# Patient Record
Sex: FEMALE | Race: WHITE | NOT HISPANIC OR LATINO | URBAN - METROPOLITAN AREA
[De-identification: names, ages, dates, MRNs, and addresses within clinical notes are randomized per-mention and may not be internally consistent; named-entity substitution may affect disease eponyms.]

---

## 2017-02-20 ENCOUNTER — OUTPATIENT (OUTPATIENT)
Dept: OUTPATIENT SERVICES | Facility: HOSPITAL | Age: 72
LOS: 1 days | End: 2017-02-20

## 2017-07-20 ENCOUNTER — OUTPATIENT (OUTPATIENT)
Dept: OUTPATIENT SERVICES | Facility: HOSPITAL | Age: 72
LOS: 1 days | End: 2017-07-20

## 2017-09-14 ENCOUNTER — OUTPATIENT (OUTPATIENT)
Dept: OUTPATIENT SERVICES | Facility: HOSPITAL | Age: 72
LOS: 1 days | End: 2017-09-14

## 2017-11-16 ENCOUNTER — OUTPATIENT (OUTPATIENT)
Dept: OUTPATIENT SERVICES | Facility: HOSPITAL | Age: 72
LOS: 1 days | End: 2017-11-16

## 2018-02-26 ENCOUNTER — OUTPATIENT (OUTPATIENT)
Dept: OUTPATIENT SERVICES | Facility: HOSPITAL | Age: 73
LOS: 1 days | End: 2018-02-26

## 2018-04-06 ENCOUNTER — OUTPATIENT (OUTPATIENT)
Dept: OUTPATIENT SERVICES | Facility: HOSPITAL | Age: 73
LOS: 1 days | End: 2018-04-06

## 2018-04-10 ENCOUNTER — OUTPATIENT (OUTPATIENT)
Dept: OUTPATIENT SERVICES | Facility: HOSPITAL | Age: 73
LOS: 1 days | End: 2018-04-10

## 2019-02-28 ENCOUNTER — OUTPATIENT (OUTPATIENT)
Dept: OUTPATIENT SERVICES | Facility: HOSPITAL | Age: 74
LOS: 1 days | End: 2019-02-28

## 2019-04-19 PROBLEM — Z00.00 ENCOUNTER FOR PREVENTIVE HEALTH EXAMINATION: Status: ACTIVE | Noted: 2019-04-19

## 2019-04-25 ENCOUNTER — APPOINTMENT (OUTPATIENT)
Dept: MAMMOGRAPHY | Facility: CLINIC | Age: 74
End: 2019-04-25
Payer: MEDICARE

## 2019-04-25 PROCEDURE — 77063 BREAST TOMOSYNTHESIS BI: CPT

## 2019-04-25 PROCEDURE — 77067 SCR MAMMO BI INCL CAD: CPT

## 2019-06-18 ENCOUNTER — OUTPATIENT (OUTPATIENT)
Dept: OUTPATIENT SERVICES | Facility: HOSPITAL | Age: 74
LOS: 1 days | End: 2019-06-18

## 2019-08-09 ENCOUNTER — OUTPATIENT (OUTPATIENT)
Dept: OUTPATIENT SERVICES | Facility: HOSPITAL | Age: 74
LOS: 1 days | End: 2019-08-09
Payer: MEDICARE

## 2019-08-09 PROCEDURE — 93010 ELECTROCARDIOGRAM REPORT: CPT

## 2019-10-17 ENCOUNTER — OUTPATIENT (OUTPATIENT)
Dept: OUTPATIENT SERVICES | Facility: HOSPITAL | Age: 74
LOS: 1 days | End: 2019-10-17

## 2019-11-19 ENCOUNTER — APPOINTMENT (OUTPATIENT)
Dept: RADIOLOGY | Facility: CLINIC | Age: 74
End: 2019-11-19
Payer: MEDICARE

## 2019-11-19 ENCOUNTER — OUTPATIENT (OUTPATIENT)
Dept: OUTPATIENT SERVICES | Facility: HOSPITAL | Age: 74
LOS: 1 days | End: 2019-11-19

## 2019-11-19 ENCOUNTER — APPOINTMENT (OUTPATIENT)
Dept: ULTRASOUND IMAGING | Facility: CLINIC | Age: 74
End: 2019-11-19
Payer: MEDICARE

## 2019-11-19 PROCEDURE — 76700 US EXAM ABDOM COMPLETE: CPT

## 2019-11-19 PROCEDURE — 71046 X-RAY EXAM CHEST 2 VIEWS: CPT

## 2019-12-22 ENCOUNTER — EMERGENCY (EMERGENCY)
Facility: HOSPITAL | Age: 74
LOS: 1 days | End: 2019-12-22
Admitting: EMERGENCY MEDICINE
Payer: MEDICARE

## 2019-12-22 PROCEDURE — 99282 EMERGENCY DEPT VISIT SF MDM: CPT

## 2019-12-29 ENCOUNTER — EMERGENCY (EMERGENCY)
Facility: HOSPITAL | Age: 74
LOS: 1 days | End: 2019-12-29
Admitting: EMERGENCY MEDICINE
Payer: MEDICARE

## 2019-12-29 PROCEDURE — 99284 EMERGENCY DEPT VISIT MOD MDM: CPT

## 2019-12-29 PROCEDURE — 74176 CT ABD & PELVIS W/O CONTRAST: CPT | Mod: 26

## 2019-12-30 ENCOUNTER — EMERGENCY (EMERGENCY)
Facility: HOSPITAL | Age: 74
LOS: 1 days | End: 2019-12-30
Admitting: EMERGENCY MEDICINE
Payer: MEDICARE

## 2019-12-30 PROCEDURE — 99284 EMERGENCY DEPT VISIT MOD MDM: CPT

## 2020-01-01 PROCEDURE — 71045 X-RAY EXAM CHEST 1 VIEW: CPT | Mod: 26

## 2020-01-01 PROCEDURE — 99285 EMERGENCY DEPT VISIT HI MDM: CPT

## 2020-01-02 ENCOUNTER — OUTPATIENT (OUTPATIENT)
Dept: OUTPATIENT SERVICES | Facility: HOSPITAL | Age: 75
LOS: 1 days | End: 2020-01-02

## 2020-01-02 ENCOUNTER — INPATIENT (INPATIENT)
Facility: HOSPITAL | Age: 75
LOS: 0 days | Discharge: HOME HEALTH | End: 2020-01-03
Admitting: STUDENT IN AN ORGANIZED HEALTH CARE EDUCATION/TRAINING PROGRAM
Payer: MEDICARE

## 2020-01-02 PROCEDURE — 72148 MRI LUMBAR SPINE W/O DYE: CPT | Mod: 26

## 2020-01-02 PROCEDURE — 99221 1ST HOSP IP/OBS SF/LOW 40: CPT

## 2020-01-03 ENCOUNTER — OUTPATIENT (OUTPATIENT)
Dept: OUTPATIENT SERVICES | Facility: HOSPITAL | Age: 75
LOS: 1 days | End: 2020-01-03

## 2020-01-08 ENCOUNTER — OUTPATIENT (OUTPATIENT)
Dept: OUTPATIENT SERVICES | Facility: HOSPITAL | Age: 75
LOS: 1 days | End: 2020-01-08

## 2020-01-13 ENCOUNTER — TRANSCRIPTION ENCOUNTER (OUTPATIENT)
Age: 75
End: 2020-01-13

## 2020-01-15 ENCOUNTER — INPATIENT (INPATIENT)
Facility: HOSPITAL | Age: 75
LOS: 0 days | Discharge: HOME | DRG: 871 | End: 2020-01-16
Attending: HOSPITALIST | Admitting: HOSPITALIST
Payer: COMMERCIAL

## 2020-01-15 ENCOUNTER — TRANSCRIPTION ENCOUNTER (OUTPATIENT)
Age: 75
End: 2020-01-15

## 2020-01-15 VITALS
RESPIRATION RATE: 20 BRPM | DIASTOLIC BLOOD PRESSURE: 94 MMHG | HEART RATE: 88 BPM | OXYGEN SATURATION: 94 % | WEIGHT: 169.98 LBS | SYSTOLIC BLOOD PRESSURE: 149 MMHG | TEMPERATURE: 98 F | HEIGHT: 60 IN

## 2020-01-15 DIAGNOSIS — I10 ESSENTIAL (PRIMARY) HYPERTENSION: ICD-10-CM

## 2020-01-15 DIAGNOSIS — A41.9 SEPSIS, UNSPECIFIED ORGANISM: ICD-10-CM

## 2020-01-15 DIAGNOSIS — Z90.49 ACQUIRED ABSENCE OF OTHER SPECIFIED PARTS OF DIGESTIVE TRACT: Chronic | ICD-10-CM

## 2020-01-15 DIAGNOSIS — M54.9 DORSALGIA, UNSPECIFIED: ICD-10-CM

## 2020-01-15 DIAGNOSIS — I48.91 UNSPECIFIED ATRIAL FIBRILLATION: ICD-10-CM

## 2020-01-15 DIAGNOSIS — J44.1 CHRONIC OBSTRUCTIVE PULMONARY DISEASE WITH (ACUTE) EXACERBATION: ICD-10-CM

## 2020-01-15 LAB
ALBUMIN SERPL ELPH-MCNC: 4 G/DL — SIGNIFICANT CHANGE UP (ref 3.3–5.2)
ALP SERPL-CCNC: 95 U/L — SIGNIFICANT CHANGE UP (ref 40–120)
ALT FLD-CCNC: 12 U/L — SIGNIFICANT CHANGE UP
ANION GAP SERPL CALC-SCNC: 16 MMOL/L — SIGNIFICANT CHANGE UP (ref 5–17)
APPEARANCE UR: CLEAR — SIGNIFICANT CHANGE UP
APTT BLD: 39.2 SEC — HIGH (ref 27.5–36.3)
AST SERPL-CCNC: 26 U/L — SIGNIFICANT CHANGE UP
BASOPHILS # BLD AUTO: 0.06 K/UL — SIGNIFICANT CHANGE UP (ref 0–0.2)
BASOPHILS NFR BLD AUTO: 0.5 % — SIGNIFICANT CHANGE UP (ref 0–2)
BILIRUB SERPL-MCNC: 0.8 MG/DL — SIGNIFICANT CHANGE UP (ref 0.4–2)
BILIRUB UR-MCNC: NEGATIVE — SIGNIFICANT CHANGE UP
BUN SERPL-MCNC: 16 MG/DL — SIGNIFICANT CHANGE UP (ref 8–20)
CALCIUM SERPL-MCNC: 9.7 MG/DL — SIGNIFICANT CHANGE UP (ref 8.6–10.2)
CHLORIDE SERPL-SCNC: 97 MMOL/L — LOW (ref 98–107)
CO2 SERPL-SCNC: 23 MMOL/L — SIGNIFICANT CHANGE UP (ref 22–29)
COLOR SPEC: YELLOW — SIGNIFICANT CHANGE UP
CREAT SERPL-MCNC: 0.86 MG/DL — SIGNIFICANT CHANGE UP (ref 0.5–1.3)
DIFF PNL FLD: ABNORMAL
EOSINOPHIL # BLD AUTO: 0.41 K/UL — SIGNIFICANT CHANGE UP (ref 0–0.5)
EOSINOPHIL NFR BLD AUTO: 3.1 % — SIGNIFICANT CHANGE UP (ref 0–6)
EPI CELLS # UR: SIGNIFICANT CHANGE UP
FLU A RESULT: SIGNIFICANT CHANGE UP
FLU A RESULT: SIGNIFICANT CHANGE UP
FLUAV AG NPH QL: SIGNIFICANT CHANGE UP
FLUBV AG NPH QL: SIGNIFICANT CHANGE UP
GLUCOSE SERPL-MCNC: 130 MG/DL — HIGH (ref 70–115)
GLUCOSE UR QL: NEGATIVE MG/DL — SIGNIFICANT CHANGE UP
HCT VFR BLD CALC: 48.2 % — HIGH (ref 34.5–45)
HGB BLD-MCNC: 15.7 G/DL — HIGH (ref 11.5–15.5)
IMM GRANULOCYTES NFR BLD AUTO: 0.8 % — SIGNIFICANT CHANGE UP (ref 0–1.5)
INR BLD: 1.82 RATIO — HIGH (ref 0.88–1.16)
KETONES UR-MCNC: NEGATIVE — SIGNIFICANT CHANGE UP
LACTATE BLDV-MCNC: 1.2 MMOL/L — SIGNIFICANT CHANGE UP (ref 0.5–2)
LEUKOCYTE ESTERASE UR-ACNC: NEGATIVE — SIGNIFICANT CHANGE UP
LYMPHOCYTES # BLD AUTO: 1.01 K/UL — SIGNIFICANT CHANGE UP (ref 1–3.3)
LYMPHOCYTES # BLD AUTO: 7.7 % — LOW (ref 13–44)
MCHC RBC-ENTMCNC: 29.7 PG — SIGNIFICANT CHANGE UP (ref 27–34)
MCHC RBC-ENTMCNC: 32.6 GM/DL — SIGNIFICANT CHANGE UP (ref 32–36)
MCV RBC AUTO: 91.3 FL — SIGNIFICANT CHANGE UP (ref 80–100)
MONOCYTES # BLD AUTO: 0.84 K/UL — SIGNIFICANT CHANGE UP (ref 0–0.9)
MONOCYTES NFR BLD AUTO: 6.4 % — SIGNIFICANT CHANGE UP (ref 2–14)
NEUTROPHILS # BLD AUTO: 10.63 K/UL — HIGH (ref 1.8–7.4)
NEUTROPHILS NFR BLD AUTO: 81.5 % — HIGH (ref 43–77)
NITRITE UR-MCNC: NEGATIVE — SIGNIFICANT CHANGE UP
PH UR: 6 — SIGNIFICANT CHANGE UP (ref 5–8)
PLATELET # BLD AUTO: 258 K/UL — SIGNIFICANT CHANGE UP (ref 150–400)
POTASSIUM SERPL-MCNC: 3.9 MMOL/L — SIGNIFICANT CHANGE UP (ref 3.5–5.3)
POTASSIUM SERPL-SCNC: 3.9 MMOL/L — SIGNIFICANT CHANGE UP (ref 3.5–5.3)
PROCALCITONIN SERPL-MCNC: 0.11 NG/ML — HIGH (ref 0.02–0.1)
PROT SERPL-MCNC: 8.4 G/DL — SIGNIFICANT CHANGE UP (ref 6.6–8.7)
PROT UR-MCNC: 15 MG/DL
PROTHROM AB SERPL-ACNC: 21.3 SEC — HIGH (ref 10–12.9)
RBC # BLD: 5.28 M/UL — HIGH (ref 3.8–5.2)
RBC # FLD: 13 % — SIGNIFICANT CHANGE UP (ref 10.3–14.5)
RBC CASTS # UR COMP ASSIST: SIGNIFICANT CHANGE UP /HPF (ref 0–4)
RSV RESULT: SIGNIFICANT CHANGE UP
RSV RNA RESP QL NAA+PROBE: SIGNIFICANT CHANGE UP
SODIUM SERPL-SCNC: 136 MMOL/L — SIGNIFICANT CHANGE UP (ref 135–145)
SP GR SPEC: 1.01 — SIGNIFICANT CHANGE UP (ref 1.01–1.02)
UROBILINOGEN FLD QL: NEGATIVE MG/DL — SIGNIFICANT CHANGE UP
WBC # BLD: 13.05 K/UL — HIGH (ref 3.8–10.5)
WBC # FLD AUTO: 13.05 K/UL — HIGH (ref 3.8–10.5)
WBC UR QL: SIGNIFICANT CHANGE UP

## 2020-01-15 PROCEDURE — 99223 1ST HOSP IP/OBS HIGH 75: CPT

## 2020-01-15 PROCEDURE — 12345: CPT | Mod: NC

## 2020-01-15 PROCEDURE — 93010 ELECTROCARDIOGRAM REPORT: CPT

## 2020-01-15 PROCEDURE — 71045 X-RAY EXAM CHEST 1 VIEW: CPT | Mod: 26

## 2020-01-15 PROCEDURE — 99285 EMERGENCY DEPT VISIT HI MDM: CPT

## 2020-01-15 RX ORDER — OXYCODONE HYDROCHLORIDE 5 MG/1
5 TABLET ORAL EVERY 6 HOURS
Refills: 0 | Status: DISCONTINUED | OUTPATIENT
Start: 2020-01-15 | End: 2020-01-16

## 2020-01-15 RX ORDER — ROSUVASTATIN CALCIUM 5 MG/1
1 TABLET ORAL
Qty: 0 | Refills: 0 | DISCHARGE

## 2020-01-15 RX ORDER — MAGNESIUM SULFATE 500 MG/ML
2 VIAL (ML) INJECTION ONCE
Refills: 0 | Status: COMPLETED | OUTPATIENT
Start: 2020-01-15 | End: 2020-01-15

## 2020-01-15 RX ORDER — AZITHROMYCIN 500 MG/1
500 TABLET, FILM COATED ORAL ONCE
Refills: 0 | Status: COMPLETED | OUTPATIENT
Start: 2020-01-15 | End: 2020-01-15

## 2020-01-15 RX ORDER — OXYCODONE AND ACETAMINOPHEN 5; 325 MG/1; MG/1
1 TABLET ORAL ONCE
Refills: 0 | Status: DISCONTINUED | OUTPATIENT
Start: 2020-01-15 | End: 2020-01-15

## 2020-01-15 RX ORDER — SERTRALINE 25 MG/1
100 TABLET, FILM COATED ORAL DAILY
Refills: 0 | Status: DISCONTINUED | OUTPATIENT
Start: 2020-01-15 | End: 2020-01-16

## 2020-01-15 RX ORDER — PANTOPRAZOLE SODIUM 20 MG/1
1 TABLET, DELAYED RELEASE ORAL
Qty: 0 | Refills: 0 | DISCHARGE

## 2020-01-15 RX ORDER — TIOTROPIUM BROMIDE 18 UG/1
1 CAPSULE ORAL; RESPIRATORY (INHALATION) DAILY
Refills: 0 | Status: DISCONTINUED | OUTPATIENT
Start: 2020-01-15 | End: 2020-01-16

## 2020-01-15 RX ORDER — ATORVASTATIN CALCIUM 80 MG/1
40 TABLET, FILM COATED ORAL AT BEDTIME
Refills: 0 | Status: DISCONTINUED | OUTPATIENT
Start: 2020-01-15 | End: 2020-01-16

## 2020-01-15 RX ORDER — ACETAMINOPHEN 500 MG
975 TABLET ORAL ONCE
Refills: 0 | Status: COMPLETED | OUTPATIENT
Start: 2020-01-15 | End: 2020-01-15

## 2020-01-15 RX ORDER — IPRATROPIUM/ALBUTEROL SULFATE 18-103MCG
3 AEROSOL WITH ADAPTER (GRAM) INHALATION
Qty: 10 | Refills: 0
Start: 2020-01-15 | End: 2020-02-13

## 2020-01-15 RX ORDER — AZITHROMYCIN 500 MG/1
500 TABLET, FILM COATED ORAL EVERY 24 HOURS
Refills: 0 | Status: DISCONTINUED | OUTPATIENT
Start: 2020-01-16 | End: 2020-01-16

## 2020-01-15 RX ORDER — AZITHROMYCIN 500 MG/1
TABLET, FILM COATED ORAL
Refills: 0 | Status: DISCONTINUED | OUTPATIENT
Start: 2020-01-15 | End: 2020-01-16

## 2020-01-15 RX ORDER — ALBUTEROL 90 UG/1
2.5 AEROSOL, METERED ORAL ONCE
Refills: 0 | Status: COMPLETED | OUTPATIENT
Start: 2020-01-15 | End: 2020-01-15

## 2020-01-15 RX ORDER — IPRATROPIUM/ALBUTEROL SULFATE 18-103MCG
3 AEROSOL WITH ADAPTER (GRAM) INHALATION EVERY 6 HOURS
Refills: 0 | Status: DISCONTINUED | OUTPATIENT
Start: 2020-01-15 | End: 2020-01-16

## 2020-01-15 RX ORDER — AZITHROMYCIN 500 MG/1
1 TABLET, FILM COATED ORAL
Qty: 3 | Refills: 0
Start: 2020-01-15 | End: 2020-01-17

## 2020-01-15 RX ORDER — CEFEPIME 1 G/1
2000 INJECTION, POWDER, FOR SOLUTION INTRAMUSCULAR; INTRAVENOUS ONCE
Refills: 0 | Status: COMPLETED | OUTPATIENT
Start: 2020-01-15 | End: 2020-01-15

## 2020-01-15 RX ORDER — APIXABAN 2.5 MG/1
5 TABLET, FILM COATED ORAL EVERY 12 HOURS
Refills: 0 | Status: DISCONTINUED | OUTPATIENT
Start: 2020-01-15 | End: 2020-01-16

## 2020-01-15 RX ORDER — ACETAMINOPHEN 500 MG
650 TABLET ORAL EVERY 6 HOURS
Refills: 0 | Status: DISCONTINUED | OUTPATIENT
Start: 2020-01-15 | End: 2020-01-16

## 2020-01-15 RX ORDER — AZITHROMYCIN 500 MG/1
1 TABLET, FILM COATED ORAL
Qty: 4 | Refills: 0
Start: 2020-01-15 | End: 2020-01-18

## 2020-01-15 RX ORDER — OLMESARTAN MEDOXOMIL 5 MG/1
1 TABLET, FILM COATED ORAL
Qty: 0 | Refills: 0 | DISCHARGE

## 2020-01-15 RX ORDER — METOPROLOL TARTRATE 50 MG
25 TABLET ORAL DAILY
Refills: 0 | Status: DISCONTINUED | OUTPATIENT
Start: 2020-01-15 | End: 2020-01-16

## 2020-01-15 RX ORDER — ALPRAZOLAM 0.25 MG
0.25 TABLET ORAL ONCE
Refills: 0 | Status: DISCONTINUED | OUTPATIENT
Start: 2020-01-15 | End: 2020-01-15

## 2020-01-15 RX ORDER — LOSARTAN POTASSIUM 100 MG/1
100 TABLET, FILM COATED ORAL DAILY
Refills: 0 | Status: DISCONTINUED | OUTPATIENT
Start: 2020-01-15 | End: 2020-01-16

## 2020-01-15 RX ORDER — IPRATROPIUM/ALBUTEROL SULFATE 18-103MCG
3 AEROSOL WITH ADAPTER (GRAM) INHALATION
Refills: 0 | Status: COMPLETED | OUTPATIENT
Start: 2020-01-15 | End: 2020-01-15

## 2020-01-15 RX ORDER — PANTOPRAZOLE SODIUM 20 MG/1
40 TABLET, DELAYED RELEASE ORAL
Refills: 0 | Status: DISCONTINUED | OUTPATIENT
Start: 2020-01-15 | End: 2020-01-16

## 2020-01-15 RX ORDER — BUDESONIDE AND FORMOTEROL FUMARATE DIHYDRATE 160; 4.5 UG/1; UG/1
2 AEROSOL RESPIRATORY (INHALATION)
Refills: 0 | Status: DISCONTINUED | OUTPATIENT
Start: 2020-01-15 | End: 2020-01-16

## 2020-01-15 RX ORDER — SODIUM CHLORIDE 9 MG/ML
1400 INJECTION, SOLUTION INTRAVENOUS ONCE
Refills: 0 | Status: COMPLETED | OUTPATIENT
Start: 2020-01-15 | End: 2020-01-15

## 2020-01-15 RX ADMIN — CEFEPIME 100 MILLIGRAM(S): 1 INJECTION, POWDER, FOR SOLUTION INTRAMUSCULAR; INTRAVENOUS at 01:21

## 2020-01-15 RX ADMIN — ATORVASTATIN CALCIUM 40 MILLIGRAM(S): 80 TABLET, FILM COATED ORAL at 21:13

## 2020-01-15 RX ADMIN — SERTRALINE 100 MILLIGRAM(S): 25 TABLET, FILM COATED ORAL at 11:12

## 2020-01-15 RX ADMIN — APIXABAN 5 MILLIGRAM(S): 2.5 TABLET, FILM COATED ORAL at 06:47

## 2020-01-15 RX ADMIN — Medication 3 MILLILITER(S): at 01:21

## 2020-01-15 RX ADMIN — Medication 3 MILLILITER(S): at 15:17

## 2020-01-15 RX ADMIN — APIXABAN 5 MILLIGRAM(S): 2.5 TABLET, FILM COATED ORAL at 16:52

## 2020-01-15 RX ADMIN — Medication 650 MILLIGRAM(S): at 20:37

## 2020-01-15 RX ADMIN — LOSARTAN POTASSIUM 100 MILLIGRAM(S): 100 TABLET, FILM COATED ORAL at 06:48

## 2020-01-15 RX ADMIN — AZITHROMYCIN 255 MILLIGRAM(S): 500 TABLET, FILM COATED ORAL at 06:46

## 2020-01-15 RX ADMIN — Medication 50 GRAM(S): at 02:05

## 2020-01-15 RX ADMIN — ALBUTEROL 2.5 MILLIGRAM(S): 90 AEROSOL, METERED ORAL at 03:37

## 2020-01-15 RX ADMIN — Medication 3 MILLILITER(S): at 19:32

## 2020-01-15 RX ADMIN — Medication 650 MILLIGRAM(S): at 11:24

## 2020-01-15 RX ADMIN — Medication 3 MILLILITER(S): at 02:00

## 2020-01-15 RX ADMIN — Medication 40 MILLIGRAM(S): at 06:47

## 2020-01-15 RX ADMIN — Medication 975 MILLIGRAM(S): at 01:20

## 2020-01-15 RX ADMIN — Medication 0.25 MILLIGRAM(S): at 21:13

## 2020-01-15 RX ADMIN — Medication 975 MILLIGRAM(S): at 20:37

## 2020-01-15 RX ADMIN — OXYCODONE HYDROCHLORIDE 5 MILLIGRAM(S): 5 TABLET ORAL at 21:50

## 2020-01-15 RX ADMIN — Medication 3 MILLILITER(S): at 01:14

## 2020-01-15 RX ADMIN — Medication 125 MILLIGRAM(S): at 01:14

## 2020-01-15 RX ADMIN — PANTOPRAZOLE SODIUM 40 MILLIGRAM(S): 20 TABLET, DELAYED RELEASE ORAL at 09:34

## 2020-01-15 RX ADMIN — OXYCODONE AND ACETAMINOPHEN 1 TABLET(S): 5; 325 TABLET ORAL at 20:37

## 2020-01-15 RX ADMIN — Medication 25 MILLIGRAM(S): at 06:57

## 2020-01-15 RX ADMIN — OXYCODONE AND ACETAMINOPHEN 1 TABLET(S): 5; 325 TABLET ORAL at 03:37

## 2020-01-15 RX ADMIN — SODIUM CHLORIDE 1400 MILLILITER(S): 9 INJECTION, SOLUTION INTRAVENOUS at 01:14

## 2020-01-15 RX ADMIN — OXYCODONE HYDROCHLORIDE 5 MILLIGRAM(S): 5 TABLET ORAL at 21:13

## 2020-01-15 RX ADMIN — Medication 3 MILLILITER(S): at 09:29

## 2020-01-15 RX ADMIN — Medication 1 TABLET(S): at 11:12

## 2020-01-15 RX ADMIN — Medication 40 MILLIGRAM(S): at 16:52

## 2020-01-15 NOTE — ED PROVIDER NOTE - OBJECTIVE STATEMENT
73 y/o F pt with significant PMHx of A-fib and COPD presents to the ED c/o SOB that started today with diarrhea, as well as, a cough that started yesterday. Pt states she is visiting her daughter due to back problems. A week ago she was in the hospital for an MRI of her lumbar spine and was discharged on steroids and muscle relaxers, but her PMD took her off the steroids. Has never been admitted for her COPD. No further complaints at this time.

## 2020-01-15 NOTE — DISCHARGE NOTE PROVIDER - NSDCMRMEDTOKEN_GEN_ALL_CORE_FT
azithromycin 250 mg oral tablet: 1 tab(s) orally once a day   biotin 1000 mcg oral tablet: 1 tab(s) orally once a day  Calcium 600+D oral tablet: 1 tab(s) orally once a day  Eliquis 5 mg oral tablet: 1 tab(s) orally 2 times a day  ipratropium-albuterol 0.5 mg-2.5 mg/3 mLinhalation solution: 3 milliliter(s) inhaled every 6 hours, As Needed   Metoprolol Succinate ER 25 mg oral tablet, extended release: 1 tab(s) orally once a day  nebulizer machine: 1   olmesartan 40 mg oral tablet: 1 tab(s) orally once a day  oxyCODONE 5 mg oral capsule: 1 cap(s) orally every 6 hours, As Needed  pantoprazole 40 mg oral delayed release tablet: 1 tab(s) orally once a day  predniSONE 10 mg oral tablet: 4 tab po daily x 2 days then taper by 10mg every 2 days then stop   rosuvastatin 10 mg oral tablet: 1 tab(s) orally once a day  sertraline 100 mg oral tablet: 1 tab(s) orally once a day azithromycin 250 mg oral tablet: 1 tab(s) orally once a day   PLEASE DISREGARD other script thank yin   biotin 1000 mcg oral tablet: 1 tab(s) orally once a day  Calcium 600+D oral tablet: 1 tab(s) orally once a day  Eliquis 5 mg oral tablet: 1 tab(s) orally 2 times a day  ipratropium-albuterol 0.5 mg-2.5 mg/3 mLinhalation solution: 3 milliliter(s) inhaled every 6 hours, As Needed   Metoprolol Succinate ER 25 mg oral tablet, extended release: 1 tab(s) orally once a day  nebulizer machine: 1   olmesartan 40 mg oral tablet: 1 tab(s) orally once a day  oxyCODONE 5 mg oral capsule: 1 cap(s) orally every 6 hours, As Needed  pantoprazole 40 mg oral delayed release tablet: 1 tab(s) orally once a day  predniSONE 10 mg oral tablet: 4 tab po daily x 2 days then taper by 10mg every 2 days then stop   rosuvastatin 10 mg oral tablet: 1 tab(s) orally once a day  sertraline 100 mg oral tablet: 1 tab(s) orally once a day azithromycin 250 mg oral tablet: 1 tab(s) orally once a day   PLEASE DISREGARD other script thank yin   biotin 1000 mcg oral tablet: 1 tab(s) orally once a day  Calcium 600+D oral tablet: 1 tab(s) orally once a day  Eliquis 5 mg oral tablet: 1 tab(s) orally 2 times a day  home o2: 1   ipratropium-albuterol 0.5 mg-2.5 mg/3 mLinhalation solution: 3 milliliter(s) inhaled every 6 hours, As Needed   Metoprolol Succinate ER 25 mg oral tablet, extended release: 1 tab(s) orally once a day  nebulizer machine: 1   olmesartan 40 mg oral tablet: 1 tab(s) orally once a day  oxyCODONE 5 mg oral capsule: 1 cap(s) orally every 6 hours, As Needed  pantoprazole 40 mg oral delayed release tablet: 1 tab(s) orally once a day  predniSONE 10 mg oral tablet: 4 tab po daily x 2 days then taper by 10mg every 2 days then stop   rosuvastatin 10 mg oral tablet: 1 tab(s) orally once a day  sertraline 100 mg oral tablet: 1 tab(s) orally once a day

## 2020-01-15 NOTE — ED ADULT NURSE REASSESSMENT NOTE - NS ED NURSE REASSESS COMMENT FT1
per daughter, would like to have pts cardiologist - rudy tavares [phone] in chart per daughter, would like to have pts cardiologist - rudy tavares [phone] and dr shah [phone] in chart

## 2020-01-15 NOTE — ED ADULT TRIAGE NOTE - CHIEF COMPLAINT QUOTE
patient states that she "cant catch her breath" states that this started yesterday, denies any complaints of pain or discomfort patient states that she has had a cough since yesterday also. patient on o2 by ems states that she feels better on the oxygen

## 2020-01-15 NOTE — PHYSICAL THERAPY INITIAL EVALUATION ADULT - GENERAL OBSERVATIONS, REHAB EVAL
Pt received in stretcher, + IV Loc, +Tele, + NC O2, in NAD, in 0/10 pain, daughter & significant other present, agreeable to PT evaluation

## 2020-01-15 NOTE — ED ADULT NURSE NOTE - NSIMPLEMENTINTERV_GEN_ALL_ED
Implemented All Fall Risk Interventions:  [place] to call system. Call bell, personal items and telephone within reach. Instruct patient to call for assistance. Room bathroom lighting operational. Non-slip footwear when patient is off stretcher. Physically safe environment: no spills, clutter or unnecessary equipment. Stretcher in lowest position, wheels locked, appropriate side rails in place. Provide visual cue, wrist band, yellow gown, etc. Monitor gait and stability. Monitor for mental status changes and reorient to person, place, and time. Review medications for side effects contributing to fall risk. Reinforce activity limits and safety measures with patient and family.

## 2020-01-15 NOTE — DISCHARGE NOTE PROVIDER - HOSPITAL COURSE
74F hx afib on eliquis, COPD, AVR (bovine) presents with worsening shortness of breath. Patient states shortness of breath started two nights prior and continued to worsen. SOB associated with cough, slightly worse when laying down and with exertion. Patient was recently hospitalized at [facility] [place] for L lower back pain work up including MRI only showed spinal stenosis. She was given antibiotics for possible UTI and medrol dose pack which finished about a week prior. Patient has history of copd, but not on oxygen and never been hospitalized.         In ED, pt wpiked fever to 101.5, P- 105, bp- 149/94, RR- 20 and Spo2- 94 on RA. SpO2 dropped to 88% with ambulation. Pt recieved solumedrol 125, cefepime 2g, LR 1.4 L, tylenol and nebulizer x2.         patient admitted to medicine and RVP was negative, startef on zpack and clinically improved on nebs and steroids. patient wanted to go home and informed her daughter that if blood cultures resulted as pos that she would have to return. Patient is now stable for dc home with po abxm and steroid taper.         time spent ond c 32 minutes 74F hx afib on eliquis, COPD, AVR (bovine) presents with worsening shortness of breath. Patient states shortness of breath started two nights prior and continued to worsen. SOB associated with cough, slightly worse when laying down and with exertion. [place] [facility] [place] work up including MRI only showed spinal stenosis. She was given antibiotics for possible UTI and medrol dose pack which finished about a week prior. Patient has history of copd, but not on oxygen and never been hospitalized.         In ED, pt wpiked fever to 101.5, P- 105, bp- 149/94, RR- 20 and Spo2- 94 on RA. SpO2 dropped to 88% with ambulation. Pt recieved solumedrol 125, cefepime 2g, LR 1.4 L, tylenol and nebulizer x2.         patient admitted to medicine and RVP was negative, startef on zpack and clinically improved on nebs and steroids. patient wanted to go home and informed her daughter that if blood cultures resulted as pos that she would have to return. Patient is now stable for dc home with po abx and steroid taper.         time spent ond c 32 minutes 74F hx afib on eliquis, COPD, AVR (bovine) presents with worsening shortness of breath. Patient states shortness of breath started two nights prior and continued to worsen. SOB associated with cough, slightly worse when laying down and with exertion. Patient was recently hospitalized at [facility] [place] for L lower back pain work up including MRI only showed spinal stenosis. She was given antibiotics for possible UTI and medrol dose pack which finished about a week prior. Patient has history of copd, but not on oxygen and never been hospitalized.         In ED, pt wpiked fever to 101.5, P- 105, bp- 149/94, RR- 20 and Spo2- 94 on RA. SpO2 dropped to 88% with ambulation. Pt recieved solumedrol 125, cefepime 2g, LR 1.4 L, tylenol and nebulizer x2.         patient admitted to medicine and RVP was negative, startef on zpack and clinically improved on nebs and steroids. patient wanted to go home and informed her daughter that if blood cultures resulted as pos that she would have to return. Patient is now stable for dc home with po abx and steroid taper.         time spent ond c 32 minutes            home o2 eval:     ICD 10 CODE - J44.9    SPO2 on ambulation - 86%    On 2 liters SPO2 95%    Patient will need portable o2 and concentrator     Lif[employer] 74F hx afib on eliquis, COPD, AVR (bovine) presents with worsening shortness of breath. Patient states shortness of breath started two nights prior and continued to worsen. SOB associated with cough, slightly worse when laying down and with exertion. Patient [place] [facility] [place] pain work up including MRI only showed spinal stenosis. She was given antibiotics for possible UTI and medrol dose pack which finished about a week prior. Patient has history of copd, but not on oxygen and never been hospitalized.         In ED, pt wpiked fever to 101.5, P- 105, bp- 149/94, RR- 20 and Spo2- 94 on RA. SpO2 dropped to 88% with ambulation. Pt recieved solumedrol 125, cefepime 2g, LR 1.4 L, tylenol and nebulizer x2.         patient admitted to medicine and RVP was negative, startef on zpack and clinically improved on nebs and steroids. patient wanted to go home and informed her daughter that if blood cultures resulted as pos that she would have to return. Patient is now stable for dc home with po abx and steroid taper.         time spent ond c 32 minutes            home o2 eval:     ICD 10 CODE - J44.9    SPO2 on ambulation room air- spo2 86%    On 2 liters SPO2 95%    at rest on room air sp02 - 90%    Patient will need portable o2 and concentrator     Lifelong        COPD exacerbation resolved and is in a chronic state, 74F hx afib on eliquis, COPD, AVR (bovine) presents with worsening shortness of breath. Patient states shortness of breath started two nights prior and continued to worsen. SOB associated with cough, slightly worse when laying down and with exertion. Patient [place] [facility] [place] pain work up including MRI only showed spinal stenosis. She was given antibiotics for possible UTI and medrol dose pack which finished about a week prior. Patient has history of copd, but not on oxygen and never been hospitalized.         In ED, pt wpiked fever to 101.5, P- 105, bp- 149/94, RR- 20 and Spo2- 94 on RA. SpO2 dropped to 88% with ambulation. Pt recieved solumedrol 125, cefepime 2g, LR 1.4 L, tylenol and nebulizer x2.         patient admitted to medicine and RVP was negative, startef on zpack and clinically improved on nebs and steroids. patient wanted to go home and informed her daughter that if blood cultures resulted as pos that she would have to return. Patient is now stable for dc home with po abx and steroid taper.         time spent ond c 32 minutes            home o2 eval:     ICD 10 CODE - J44.9    at rest on room air sp02 - 90%    SPO2 on ambulation room air- spo2 86%    On 2 liters SPO2 95% while ambulating    Patient will need portable o2 and concentrator     Lifelong        COPD exacerbation resolved and is in a chronic state,

## 2020-01-15 NOTE — ED PROVIDER NOTE - CARE PLAN
Principal Discharge DX:	Chronic obstructive pulmonary disease with acute exacerbation  Secondary Diagnosis:	Hypoxia  Secondary Diagnosis:	Atrial fibrillation with tachycardic ventricular rate

## 2020-01-15 NOTE — H&P ADULT - NSICDXPASTMEDICALHX_GEN_ALL_CORE_FT
PAST MEDICAL HISTORY:  Aortic valve replaced     Atrial fibrillation     COPD (chronic obstructive pulmonary disease)

## 2020-01-15 NOTE — H&P ADULT - PROBLEM SELECTOR PLAN 1
pt with copd exacerbation, check rvp to r/o viral illness  no pna on cxr or exam  will continue pt on solu-medrol 40 bid and azithromycin  duonebs q6, symbicort and spiriva  monitor on pulse ox

## 2020-01-15 NOTE — ED ADULT TRIAGE NOTE - ARRIVAL FROM
Home Finasteride Pregnancy And Lactation Text: This medication is absolutely contraindicated during pregnancy. It is unknown if it is excreted in breast milk.

## 2020-01-15 NOTE — ED ADULT NURSE REASSESSMENT NOTE - NS ED NURSE REASSESS COMMENT FT1
MD Luciano at bedside for admission assessment; plan for abx, steroids and nebulizer treatments.  POC explained to pt. and family

## 2020-01-15 NOTE — ED ADULT NURSE REASSESSMENT NOTE - COMFORT CARE
plan of care explained/warm blanket provided/po fluids offered/repositioned/side rails down/wait time explained/meal provided/assisted to bathroom

## 2020-01-15 NOTE — H&P ADULT - HISTORY OF PRESENT ILLNESS
74F hx afib on eliquis, COPD, AVR (bovine) presents with worsening shortness of breath. Patient states shortness of breath started two nights ago and continued to worsen today. SOB associated with cough, slightly worse when laying down and with exertion. Patient was recently hospitalized [place] [facility] [place] lower back pain work up including MRI only showed spinal stenosis. She was given antibiotics for possible UTI and medrol dose pack which finished about a week ago. Patient has history of copd, but not on oxygen and never been hospitalized. Patient had a couple episodes of diarrhea yesterday that have resolved. Denies chest pain, abdominal pain, fever, chills, palpitations or dizziness.     In ED, pt wpiked fever to 101.5, P- 105, bp- 149/94, RR- 20 and Spo2- 94 on RA. SpO2 dropped to 88% with ambulation. Pt recieved solumedrol 125, cefepime 2g, LR 1.4 L, tylenol and nebulizer x2.

## 2020-01-15 NOTE — H&P ADULT - NSHPPHYSICALEXAM_GEN_ALL_CORE
Vital Signs Last 24 Hrs  T(C): 38.6 (15 Kurt 2020 01:02), Max: 38.6 (15 Kurt 2020 01:00)  T(F): 101.5 (15 Kurt 2020 01:02), Max: 101.5 (15 Kurt 2020 01:00)  HR: 98 (15 Kurt 2020 01:02) (88 - 105)  BP: 149/94 (15 Kurt 2020 00:40) (149/94 - 149/94)  BP(mean): --  RR: 20 (15 Kurt 2020 00:40) (20 - 20)  SpO2: 94% (15 Kurt 2020 00:40) (94% - 94%)    GENERAL: NAD, well-developed  HEENT:  Atraumatic, Normocephalic, MMM, no oropharyngeal lesions  EYES: EOMI, PERRLA, conjunctiva and sclera clear  NECK: Supple, No JVD, no throat tenderness.  CHEST/LUNG: Clear to auscultation bilaterally; minimal wheeze no rales or rhonchi  BACK: no spinal or paraspinal tenderness.   HEART: irregularly irregular; No murmurs, rubs, or gallops  ABDOMEN: Soft, Nontender, Nondistended; Bowel sounds present  EXTREMITIES:  2+ Peripheral Pulses, No clubbing, cyanosis, or edema  PSYCH: AAOx3, normal affect  NEUROLOGY: moves all extremities spontaneously. no sensory deficits  SKIN: No rashes or lesions

## 2020-01-15 NOTE — ED ADULT NURSE REASSESSMENT NOTE - NS ED NURSE REASSESS COMMENT FT1
received pt from off-going shift. pt a&ox3, denies any pain/discomfort. pt reports productive cough, not sure of color. denies any recent fevers. resp spontaneous even and unlabored, lungs clear, skin warm and dry, no BLE edema. ambulating with cane independently, + DOMINGUEZ. o2 2l nc placed. pending bed. updated on plan of care, verbalize understanding. call bell in reach

## 2020-01-15 NOTE — ED PROVIDER NOTE - PROGRESS NOTE DETAILS
Given respiratory symptoms not sure if pt is a candidate for IV bolus hydration. Patient with improved air movement but persistent wheezing. Patient also noted to be at 92% on 2 liter NC which decrease to 88-89% when active.

## 2020-01-15 NOTE — H&P ADULT - NSHPLABSRESULTS_GEN_ALL_CORE
15.7   13.05 )-----------( 258      ( 15 Kurt 2020 01:12 )             48.2       -15    136  |  97<L>  |  16.0  ----------------------------<  130<H>  3.9   |  23.0  |  0.86    Ca    9.7      15 Kurt 2020 01:12    TPro  8.4  /  Alb  4.0  /  TBili  0.8  /  DBili  x   /  AST  26  /  ALT  12  /  AlkPhos  95  01-15          Urinalysis Basic - ( 15 Kurt 2020 04:33 )    Color: Yellow / Appearance: Clear / S.010 / pH: x  Gluc: x / Ketone: Negative  / Bili: Negative / Urobili: Negative mg/dL   Blood: x / Protein: 15 mg/dL / Nitrite: Negative   Leuk Esterase: Negative / RBC: 0-2 /HPF / WBC 0-2   Sq Epi: x / Non Sq Epi: Few / Bacteria: x        PT/INR - ( 15 Kurt 2020 01:12 )   PT: 21.3 sec;   INR: 1.82 ratio         PTT - ( 15 Kurt 2020 01:12 )  PTT:39.2 sec    Lactate Trend    CAPILLARY BLOOD GLUCOSE    RADIOLOGY, EKG & ADDITIONAL TESTS: Reviewed.     CXR- lungs grossly clear

## 2020-01-15 NOTE — DISCHARGE NOTE PROVIDER - NSDCCPCAREPLAN_GEN_ALL_CORE_FT
PRINCIPAL DISCHARGE DIAGNOSIS  Diagnosis: Chronic obstructive pulmonary disease with acute exacerbation  Assessment and Plan of Treatment:       SECONDARY DISCHARGE DIAGNOSES  Diagnosis: Atrial fibrillation with tachycardic ventricular rate  Assessment and Plan of Treatment:     Diagnosis: Hypoxia  Assessment and Plan of Treatment:

## 2020-01-16 ENCOUNTER — TRANSCRIPTION ENCOUNTER (OUTPATIENT)
Age: 75
End: 2020-01-16

## 2020-01-16 VITALS
RESPIRATION RATE: 18 BRPM | SYSTOLIC BLOOD PRESSURE: 123 MMHG | HEART RATE: 113 BPM | OXYGEN SATURATION: 94 % | DIASTOLIC BLOOD PRESSURE: 70 MMHG | TEMPERATURE: 98 F

## 2020-01-16 LAB
ALBUMIN SERPL ELPH-MCNC: 3.4 G/DL — SIGNIFICANT CHANGE UP (ref 3.3–5.2)
ALP SERPL-CCNC: 72 U/L — SIGNIFICANT CHANGE UP (ref 40–120)
ALT FLD-CCNC: 13 U/L — SIGNIFICANT CHANGE UP
ANION GAP SERPL CALC-SCNC: 12 MMOL/L — SIGNIFICANT CHANGE UP (ref 5–17)
AST SERPL-CCNC: 27 U/L — SIGNIFICANT CHANGE UP
BILIRUB SERPL-MCNC: 0.4 MG/DL — SIGNIFICANT CHANGE UP (ref 0.4–2)
BUN SERPL-MCNC: 24 MG/DL — HIGH (ref 8–20)
CALCIUM SERPL-MCNC: 9.2 MG/DL — SIGNIFICANT CHANGE UP (ref 8.6–10.2)
CHLORIDE SERPL-SCNC: 102 MMOL/L — SIGNIFICANT CHANGE UP (ref 98–107)
CO2 SERPL-SCNC: 25 MMOL/L — SIGNIFICANT CHANGE UP (ref 22–29)
CREAT SERPL-MCNC: 0.97 MG/DL — SIGNIFICANT CHANGE UP (ref 0.5–1.3)
CULTURE RESULTS: NO GROWTH — SIGNIFICANT CHANGE UP
GLUCOSE SERPL-MCNC: 186 MG/DL — HIGH (ref 70–115)
HCT VFR BLD CALC: 38.4 % — SIGNIFICANT CHANGE UP (ref 34.5–45)
HGB BLD-MCNC: 12.8 G/DL — SIGNIFICANT CHANGE UP (ref 11.5–15.5)
MAGNESIUM SERPL-MCNC: 2.1 MG/DL — SIGNIFICANT CHANGE UP (ref 1.6–2.6)
MCHC RBC-ENTMCNC: 30.4 PG — SIGNIFICANT CHANGE UP (ref 27–34)
MCHC RBC-ENTMCNC: 33.3 GM/DL — SIGNIFICANT CHANGE UP (ref 32–36)
MCV RBC AUTO: 91.2 FL — SIGNIFICANT CHANGE UP (ref 80–100)
PLATELET # BLD AUTO: 229 K/UL — SIGNIFICANT CHANGE UP (ref 150–400)
POTASSIUM SERPL-MCNC: 4.2 MMOL/L — SIGNIFICANT CHANGE UP (ref 3.5–5.3)
POTASSIUM SERPL-SCNC: 4.2 MMOL/L — SIGNIFICANT CHANGE UP (ref 3.5–5.3)
PROT SERPL-MCNC: 7.6 G/DL — SIGNIFICANT CHANGE UP (ref 6.6–8.7)
RBC # BLD: 4.21 M/UL — SIGNIFICANT CHANGE UP (ref 3.8–5.2)
RBC # FLD: 13.3 % — SIGNIFICANT CHANGE UP (ref 10.3–14.5)
SODIUM SERPL-SCNC: 139 MMOL/L — SIGNIFICANT CHANGE UP (ref 135–145)
SPECIMEN SOURCE: SIGNIFICANT CHANGE UP
WBC # BLD: 21.6 K/UL — HIGH (ref 3.8–10.5)
WBC # FLD AUTO: 21.6 K/UL — HIGH (ref 3.8–10.5)

## 2020-01-16 PROCEDURE — 80053 COMPREHEN METABOLIC PANEL: CPT

## 2020-01-16 PROCEDURE — 93005 ELECTROCARDIOGRAM TRACING: CPT

## 2020-01-16 PROCEDURE — 85027 COMPLETE CBC AUTOMATED: CPT

## 2020-01-16 PROCEDURE — 81001 URINALYSIS AUTO W/SCOPE: CPT

## 2020-01-16 PROCEDURE — 85610 PROTHROMBIN TIME: CPT

## 2020-01-16 PROCEDURE — 84145 PROCALCITONIN (PCT): CPT

## 2020-01-16 PROCEDURE — 87040 BLOOD CULTURE FOR BACTERIA: CPT

## 2020-01-16 PROCEDURE — 87631 RESP VIRUS 3-5 TARGETS: CPT

## 2020-01-16 PROCEDURE — 96375 TX/PRO/DX INJ NEW DRUG ADDON: CPT

## 2020-01-16 PROCEDURE — 94760 N-INVAS EAR/PLS OXIMETRY 1: CPT

## 2020-01-16 PROCEDURE — 99239 HOSP IP/OBS DSCHRG MGMT >30: CPT

## 2020-01-16 PROCEDURE — 85730 THROMBOPLASTIN TIME PARTIAL: CPT

## 2020-01-16 PROCEDURE — 83605 ASSAY OF LACTIC ACID: CPT

## 2020-01-16 PROCEDURE — 36415 COLL VENOUS BLD VENIPUNCTURE: CPT

## 2020-01-16 PROCEDURE — 97163 PT EVAL HIGH COMPLEX 45 MIN: CPT

## 2020-01-16 PROCEDURE — 87086 URINE CULTURE/COLONY COUNT: CPT

## 2020-01-16 PROCEDURE — 99285 EMERGENCY DEPT VISIT HI MDM: CPT | Mod: 25

## 2020-01-16 PROCEDURE — 83735 ASSAY OF MAGNESIUM: CPT

## 2020-01-16 PROCEDURE — 71045 X-RAY EXAM CHEST 1 VIEW: CPT

## 2020-01-16 PROCEDURE — 96374 THER/PROPH/DIAG INJ IV PUSH: CPT

## 2020-01-16 PROCEDURE — 94640 AIRWAY INHALATION TREATMENT: CPT

## 2020-01-16 RX ADMIN — LOSARTAN POTASSIUM 100 MILLIGRAM(S): 100 TABLET, FILM COATED ORAL at 05:17

## 2020-01-16 RX ADMIN — APIXABAN 5 MILLIGRAM(S): 2.5 TABLET, FILM COATED ORAL at 05:18

## 2020-01-16 RX ADMIN — SERTRALINE 100 MILLIGRAM(S): 25 TABLET, FILM COATED ORAL at 05:17

## 2020-01-16 RX ADMIN — Medication 3 MILLILITER(S): at 14:51

## 2020-01-16 RX ADMIN — PANTOPRAZOLE SODIUM 40 MILLIGRAM(S): 20 TABLET, DELAYED RELEASE ORAL at 05:18

## 2020-01-16 RX ADMIN — Medication 650 MILLIGRAM(S): at 05:17

## 2020-01-16 RX ADMIN — Medication 40 MILLIGRAM(S): at 17:50

## 2020-01-16 RX ADMIN — Medication 25 MILLIGRAM(S): at 05:18

## 2020-01-16 RX ADMIN — AZITHROMYCIN 255 MILLIGRAM(S): 500 TABLET, FILM COATED ORAL at 05:16

## 2020-01-16 RX ADMIN — Medication 3 MILLILITER(S): at 02:44

## 2020-01-16 RX ADMIN — Medication 1 TABLET(S): at 12:48

## 2020-01-16 RX ADMIN — Medication 40 MILLIGRAM(S): at 05:18

## 2020-01-16 RX ADMIN — Medication 3 MILLILITER(S): at 07:44

## 2020-01-16 RX ADMIN — APIXABAN 5 MILLIGRAM(S): 2.5 TABLET, FILM COATED ORAL at 17:50

## 2020-01-16 RX ADMIN — Medication 650 MILLIGRAM(S): at 06:15

## 2020-01-16 NOTE — DISCHARGE NOTE NURSING/CASE MANAGEMENT/SOCIAL WORK - PATIENT PORTAL LINK FT
You can access the FollowMyHealth Patient Portal offered by [facility] by registering at the following website: [email or web address]. By joining [facility]’s FollowMyHealth portal, you will also be able to view your health information using other applications (apps) compatible with our system.

## 2020-01-20 LAB
CULTURE RESULTS: SIGNIFICANT CHANGE UP
CULTURE RESULTS: SIGNIFICANT CHANGE UP
SPECIMEN SOURCE: SIGNIFICANT CHANGE UP
SPECIMEN SOURCE: SIGNIFICANT CHANGE UP

## 2020-01-21 DIAGNOSIS — J18.9 PNEUMONIA, UNSPECIFIED ORGANISM: ICD-10-CM

## 2020-01-21 DIAGNOSIS — I48.91 UNSPECIFIED ATRIAL FIBRILLATION: ICD-10-CM

## 2020-01-21 DIAGNOSIS — F32.9 ANXIETY DISORDER, UNSPECIFIED: ICD-10-CM

## 2020-01-21 DIAGNOSIS — Z29.9 ENCOUNTER FOR PROPHYLACTIC MEASURES, UNSPECIFIED: ICD-10-CM

## 2020-01-21 DIAGNOSIS — M54.5 LOW BACK PAIN: ICD-10-CM

## 2020-01-21 DIAGNOSIS — F41.9 ANXIETY DISORDER, UNSPECIFIED: ICD-10-CM

## 2020-01-21 DIAGNOSIS — I10 ESSENTIAL (PRIMARY) HYPERTENSION: ICD-10-CM

## 2020-01-21 DIAGNOSIS — E78.5 HYPERLIPIDEMIA, UNSPECIFIED: ICD-10-CM

## 2020-01-21 RX ORDER — ROSUVASTATIN CALCIUM 10 MG/1
10 TABLET, FILM COATED ORAL
Refills: 0 | Status: ACTIVE | COMMUNITY
Start: 2020-01-21

## 2020-01-21 RX ORDER — BIOTIN 1000 MCG
1000 TABLET,CHEWABLE ORAL DAILY
Refills: 0 | Status: ACTIVE | COMMUNITY
Start: 2020-01-21

## 2020-01-21 RX ORDER — PANTOPRAZOLE 40 MG/1
40 TABLET, DELAYED RELEASE ORAL DAILY
Qty: 30 | Refills: 0 | Status: ACTIVE | COMMUNITY
Start: 2020-01-21

## 2020-01-21 RX ORDER — OLMESARTAN MEDOXOMIL 40 MG/1
40 TABLET, FILM COATED ORAL DAILY
Refills: 0 | Status: ACTIVE | COMMUNITY
Start: 2020-01-21

## 2020-01-21 RX ORDER — IBUPROFEN 200 MG
600 CAPSULE ORAL DAILY
Refills: 0 | Status: ACTIVE | COMMUNITY
Start: 2020-01-21

## 2020-01-22 ENCOUNTER — APPOINTMENT (OUTPATIENT)
Dept: CARE COORDINATION | Facility: HOME HEALTH | Age: 75
End: 2020-01-22

## 2020-01-22 PROBLEM — Z95.2 PRESENCE OF PROSTHETIC HEART VALVE: Chronic | Status: ACTIVE | Noted: 2020-01-15

## 2020-01-22 PROBLEM — I48.91 UNSPECIFIED ATRIAL FIBRILLATION: Chronic | Status: ACTIVE | Noted: 2020-01-15

## 2020-01-22 PROBLEM — J44.9 CHRONIC OBSTRUCTIVE PULMONARY DISEASE, UNSPECIFIED: Chronic | Status: ACTIVE | Noted: 2020-01-15

## 2020-01-22 PROBLEM — J44.9 CHRONIC OBSTRUCTIVE PULMONARY DISEASE, UNSPECIFIED: Chronic | Status: ACTIVE | Noted: 2020-01-21

## 2020-01-29 ENCOUNTER — APPOINTMENT (OUTPATIENT)
Dept: PULMONOLOGY | Facility: CLINIC | Age: 75
End: 2020-01-29
Payer: MEDICARE

## 2020-01-29 VITALS
HEART RATE: 84 BPM | HEIGHT: 58.5 IN | SYSTOLIC BLOOD PRESSURE: 142 MMHG | WEIGHT: 166 LBS | OXYGEN SATURATION: 95 % | BODY MASS INDEX: 33.92 KG/M2 | DIASTOLIC BLOOD PRESSURE: 82 MMHG

## 2020-01-29 DIAGNOSIS — I48.0 PAROXYSMAL ATRIAL FIBRILLATION: ICD-10-CM

## 2020-01-29 PROCEDURE — 85018 HEMOGLOBIN: CPT | Mod: QW

## 2020-01-29 PROCEDURE — 99204 OFFICE O/P NEW MOD 45 MIN: CPT | Mod: 25

## 2020-01-29 PROCEDURE — 94060 EVALUATION OF WHEEZING: CPT

## 2020-01-29 PROCEDURE — 94727 GAS DIL/WSHOT DETER LNG VOL: CPT

## 2020-01-29 PROCEDURE — 94729 DIFFUSING CAPACITY: CPT

## 2020-01-29 PROCEDURE — 94664 DEMO&/EVAL PT USE INHALER: CPT | Mod: 59

## 2020-01-29 RX ORDER — PREDNISONE 10 MG/1
10 TABLET ORAL
Qty: 20 | Refills: 0 | Status: DISCONTINUED | COMMUNITY
Start: 2020-01-21 | End: 2020-01-29

## 2020-01-29 RX ORDER — METOPROLOL SUCCINATE 25 MG/1
25 TABLET, EXTENDED RELEASE ORAL DAILY
Qty: 30 | Refills: 1 | Status: DISCONTINUED | COMMUNITY
Start: 2020-01-21 | End: 2020-01-29

## 2020-01-29 RX ORDER — AZITHROMYCIN 250 MG/1
250 TABLET, FILM COATED ORAL DAILY
Refills: 0 | Status: DISCONTINUED | COMMUNITY
Start: 2020-01-21 | End: 2020-01-29

## 2020-01-29 RX ORDER — ALPRAZOLAM 0.25 MG/1
0.25 TABLET ORAL
Refills: 0 | Status: ACTIVE | COMMUNITY
Start: 2020-01-29

## 2020-01-29 NOTE — PHYSICAL EXAM
[No Acute Distress] : no acute distress [Normal Oropharynx] : normal oropharynx [Normal Appearance] : normal appearance [No Neck Mass] : no neck mass [Normal Rate/Rhythm] : normal rate/rhythm [Normal S1, S2] : normal s1, s2 [No Murmurs] : no murmurs [No Resp Distress] : no resp distress [No Acc Muscle Use] : no acc muscle use [Normal to Percussion] : normal to percussion [No Abnormalities] : no abnormalities [Benign] : benign [Normal Gait] : normal gait [No Clubbing] : no clubbing [No Cyanosis] : no cyanosis [No Edema] : no edema [No Focal Deficits] : no focal deficits [Normal Color/ Pigmentation] : normal color/ pigmentation [Oriented x3] : oriented x3 [Normal Affect] : normal affect [TextBox_68] : moderate air entry bilat

## 2020-01-29 NOTE — PROCEDURE
[FreeTextEntry1] : PFts : severe air flow obstruction with reversible component, hyperinflation, impaired DL which corrects\par \par Hospital records reviewed\par CXR limited ? infiltrate L base, flu/rsv negative, all cultures  negative

## 2020-01-29 NOTE — CONSULT LETTER
[Dear  ___] : Dear  [unfilled], [Consult Letter:] : I had the pleasure of evaluating your patient, [unfilled]. [Please see my note below.] : Please see my note below. [Sincerely,] : Sincerely, [DrDaphne  ___] : Dr. BENNETT [DrDaphne ___] : Dr. BENNETT [FreeTextEntry3] : [facility] DO [facility]\par Pulmonary Critical Care\par Director Pulmonary Division\par Medical Director Respiratory Therapy\par [facility]\par \par

## 2020-01-29 NOTE — HISTORY OF PRESENT ILLNESS
[TextBox_4] : 30 pack yr smoker, quit 30 yrs ago\par treated as COPD\par has home 02 from [facility]\par has not used\par has home nebulizer, uses TID-BID\par feels sig better\par multiple admission [facility] [place]\par now on Trelegy, problems with multiple inhalers\par no chest pain or sputum\par PAF on full AC

## 2020-01-29 NOTE — REVIEW OF SYSTEMS
[Negative] : Psychiatric [TextBox_3] : some weight loss [TextBox_30] : see HPI [TextBox_44] : see HPI

## 2020-01-29 NOTE — DISCUSSION/SUMMARY
[FreeTextEntry1] : Severe COPD with asthmatic component\par currently lung exam without bronchospasm , normal sp02\par much improved from [facility] stay, CXR limited, needs CT scan\par underlying PAF on full AC and spinal stenosis by hx\par viral and cultures negative, sent home on 02, currently not using\par No sig desat with ex today\par Will initiate Breo daily with duo neb bid and prn, slow pred taper\par Asthma profile\par vaccinations this fall\par await Cardiac work up\par 6 weeks or sooner if needed

## 2020-02-20 ENCOUNTER — OUTPATIENT (OUTPATIENT)
Dept: OUTPATIENT SERVICES | Facility: HOSPITAL | Age: 75
LOS: 1 days | End: 2020-02-20

## 2020-02-20 DIAGNOSIS — Z90.49 ACQUIRED ABSENCE OF OTHER SPECIFIED PARTS OF DIGESTIVE TRACT: Chronic | ICD-10-CM

## 2020-03-10 ENCOUNTER — APPOINTMENT (OUTPATIENT)
Dept: PULMONOLOGY | Facility: CLINIC | Age: 75
End: 2020-03-10
Payer: MEDICARE

## 2020-03-10 VITALS
WEIGHT: 171 LBS | BODY MASS INDEX: 34.02 KG/M2 | HEIGHT: 59.5 IN | RESPIRATION RATE: 16 BRPM | HEART RATE: 91 BPM | DIASTOLIC BLOOD PRESSURE: 82 MMHG | SYSTOLIC BLOOD PRESSURE: 138 MMHG | OXYGEN SATURATION: 95 %

## 2020-03-10 PROCEDURE — 99215 OFFICE O/P EST HI 40 MIN: CPT

## 2020-03-10 RX ORDER — OXYCODONE 5 MG/1
5 TABLET ORAL EVERY 6 HOURS
Refills: 0 | Status: DISCONTINUED | COMMUNITY
Start: 2020-01-21 | End: 2020-03-10

## 2020-03-10 RX ORDER — PREDNISONE 10 MG/1
10 TABLET ORAL
Qty: 30 | Refills: 6 | Status: DISCONTINUED | COMMUNITY
Start: 2020-01-29 | End: 2020-03-10

## 2020-03-10 RX ORDER — METOPROLOL SUCCINATE 50 MG/1
50 TABLET, EXTENDED RELEASE ORAL
Refills: 0 | Status: DISCONTINUED | COMMUNITY
Start: 2020-01-29 | End: 2020-03-10

## 2020-03-10 RX ORDER — FLUTICASONE FUROATE AND VILANTEROL TRIFENATATE 50; 25 UG/1; UG/1
POWDER RESPIRATORY (INHALATION)
Refills: 0 | Status: DISCONTINUED | COMMUNITY
End: 2020-03-10

## 2020-03-10 RX ORDER — METOPROLOL TARTRATE 100 MG/1
100 TABLET, FILM COATED ORAL
Refills: 0 | Status: ACTIVE | COMMUNITY

## 2020-03-10 NOTE — CONSULT LETTER
[Dear  ___] : Dear  [unfilled], [Consult Letter:] : I had the pleasure of evaluating your patient, [unfilled]. [Please see my note below.] : Please see my note below. [Sincerely,] : Sincerely, [DrDaphne  ___] : Dr. BENNETT [DrDaphne ___] : Dr. BENNETT [FreeTextEntry3] : [facility]\par Pulmonary Critical Care\par Director Pulmonary Division\par Medical Director Respiratory Therapy\par [facility]\par \par

## 2020-03-10 NOTE — HISTORY OF PRESENT ILLNESS
[TextBox_4] : 30 pack yr smoker, quit 30 yrs ago\par treated as COPD\par has home 02 from [facility],has not used\par has home nebulizer, uses TID-BID\par feels sig better\par multiple admission [facility] [place]\par now on Breo, duo neb\par no chest pain or sputum\par PAF on full AC

## 2020-03-10 NOTE — DISCUSSION/SUMMARY
[FreeTextEntry1] : Severe COPD with asthmatic component\par currently lung exam without bronchospasm , normal sp02\par Improved, CT scan without pneumonia or suspicious nodule\par no desaturation with exercise, does not want 02 and not wearing, will d/c\par underlying PAF on full AC and spinal stenosis by hx\par continue Breo 200 daily with duo neb bid and prn, \par action plan reviewed\par Asthma profile, quantiferon\par vaccinations this winter\par Cardiology following\par 3 months with spirometry or sooner if needed\par 6 weeks or sooner if needed

## 2020-04-28 RX ORDER — FLUTICASONE FUROATE AND VILANTEROL TRIFENATATE 100; 25 UG/1; UG/1
100-25 POWDER RESPIRATORY (INHALATION)
Refills: 0 | Status: COMPLETED | COMMUNITY
End: 2020-04-28

## 2020-06-03 ENCOUNTER — RX CHANGE (OUTPATIENT)
Age: 75
End: 2020-06-03

## 2020-06-03 RX ORDER — PREDNISONE 10 MG/1
10 TABLET ORAL
Qty: 30 | Refills: 3 | Status: DISCONTINUED | COMMUNITY
Start: 2020-03-10 | End: 2020-06-03

## 2020-06-04 ENCOUNTER — RX CHANGE (OUTPATIENT)
Age: 75
End: 2020-06-04

## 2020-06-05 RX ORDER — IPRATROPIUM BROMIDE AND ALBUTEROL SULFATE 2.5; .5 MG/3ML; MG/3ML
0.5-2.5 (3) SOLUTION RESPIRATORY (INHALATION) 4 TIMES DAILY
Qty: 1 | Refills: 0 | Status: DISCONTINUED | COMMUNITY
Start: 2020-01-21 | End: 2020-06-05

## 2020-06-05 RX ORDER — TIOTROPIUM BROMIDE 18 UG/1
18 CAPSULE ORAL; RESPIRATORY (INHALATION) DAILY
Qty: 1 | Refills: 5 | Status: DISCONTINUED | COMMUNITY
Start: 2020-06-03 | End: 2020-06-05

## 2020-06-16 ENCOUNTER — APPOINTMENT (OUTPATIENT)
Dept: PULMONOLOGY | Facility: CLINIC | Age: 75
End: 2020-06-16
Payer: MEDICARE

## 2020-06-16 VITALS
SYSTOLIC BLOOD PRESSURE: 130 MMHG | HEIGHT: 59.5 IN | OXYGEN SATURATION: 94 % | HEART RATE: 84 BPM | DIASTOLIC BLOOD PRESSURE: 70 MMHG

## 2020-06-16 PROCEDURE — 99214 OFFICE O/P EST MOD 30 MIN: CPT

## 2020-06-16 RX ORDER — RIVAROXABAN 10 MG/1
10 TABLET, FILM COATED ORAL
Refills: 0 | Status: ACTIVE | COMMUNITY
Start: 2020-06-16

## 2020-06-16 RX ORDER — FLUTICASONE FUROATE AND VILANTEROL TRIFENATATE 200; 25 UG/1; UG/1
200-25 POWDER RESPIRATORY (INHALATION) DAILY
Qty: 3 | Refills: 2 | Status: DISCONTINUED | COMMUNITY
Start: 2020-03-10 | End: 2020-06-16

## 2020-06-16 RX ORDER — APIXABAN 5 MG/1
5 TABLET, FILM COATED ORAL
Qty: 180 | Refills: 2 | Status: DISCONTINUED | COMMUNITY
Start: 2020-01-21 | End: 2020-06-16

## 2020-06-16 RX ORDER — IPRATROPIUM BROMIDE AND ALBUTEROL SULFATE 2.5; .5 MG/3ML; MG/3ML
0.5-2.5 (3) SOLUTION RESPIRATORY (INHALATION) 4 TIMES DAILY
Qty: 1 | Refills: 3 | Status: DISCONTINUED | COMMUNITY
Start: 2020-03-10 | End: 2020-06-16

## 2020-06-16 NOTE — PROCEDURE
[FreeTextEntry1] : PFts : severe air flow obstruction with reversible component, hyperinflation, impaired DL which corrects\par \par CT scan 1/20:  mild emphysema, granulomas, prom pulmonary artery

## 2020-06-16 NOTE — DISCUSSION/SUMMARY
[FreeTextEntry1] : Severe COPD with asthmatic component\par currently lung exam without bronchospasm , normal sp02\par Improved, CT scan without pneumonia or suspicious nodule\par problems with Breo, doesn’t want inhaled steroids or singulair, will continue Anoro\par underlying PAF on full AC and spinal stenosis by hx\par action plan reviewed\par Asthma profile\par Cardiology following\par 3 months with spirometry or sooner if needed, covid testing prior\par

## 2020-06-16 NOTE — PHYSICAL EXAM
[No Acute Distress] : no acute distress [Normal Oropharynx] : normal oropharynx [Normal Rate/Rhythm] : normal rate/rhythm [No Neck Mass] : no neck mass [Normal Appearance] : normal appearance [Normal S1, S2] : normal s1, s2 [No Resp Distress] : no resp distress [No Murmurs] : no murmurs [Normal to Percussion] : normal to percussion [No Acc Muscle Use] : no acc muscle use [No Abnormalities] : no abnormalities [Normal Gait] : normal gait [No Clubbing] : no clubbing [Benign] : benign [Normal Color/ Pigmentation] : normal color/ pigmentation [No Cyanosis] : no cyanosis [No Edema] : no edema [No Focal Deficits] : no focal deficits [Oriented x3] : oriented x3 [Normal Affect] : normal affect [TextBox_68] : moderate air entry bilat

## 2020-06-16 NOTE — CONSULT LETTER
[Consult Letter:] : I had the pleasure of evaluating your patient, [unfilled]. [Dear  ___] : Dear  [unfilled], [Sincerely,] : Sincerely, [Please see my note below.] : Please see my note below. [DrDaphne  ___] : Dr. BENNETT [DrDaphne ___] : Dr. BENNETT [FreeTextEntry3] : [facility]\par Pulmonary Critical Care\par Director Pulmonary Division\par Medical Director Respiratory Therapy\par [facility]\par \par

## 2020-06-16 NOTE — HISTORY OF PRESENT ILLNESS
[TextBox_4] : 30 pack yr smoker, quit 30 yrs ago\par treated as COPD\par doing well\par using anoro daily, had throat problems with Breo\par now on Xarelto\par no chest pain or sputum\par no fever, chill, chest pain or sputum\par Dyspnea better

## 2020-06-29 ENCOUNTER — RX RENEWAL (OUTPATIENT)
Age: 75
End: 2020-06-29

## 2020-08-25 ENCOUNTER — APPOINTMENT (OUTPATIENT)
Dept: NEUROSURGERY | Facility: CLINIC | Age: 75
End: 2020-08-25
Payer: MEDICARE

## 2020-08-25 VITALS
DIASTOLIC BLOOD PRESSURE: 92 MMHG | HEART RATE: 64 BPM | WEIGHT: 167 LBS | SYSTOLIC BLOOD PRESSURE: 192 MMHG | HEIGHT: 58 IN | BODY MASS INDEX: 35.05 KG/M2

## 2020-08-25 DIAGNOSIS — M43.16 SPONDYLOLISTHESIS, LUMBAR REGION: ICD-10-CM

## 2020-08-25 DIAGNOSIS — Z86.39 PERSONAL HISTORY OF OTHER ENDOCRINE, NUTRITIONAL AND METABOLIC DISEASE: ICD-10-CM

## 2020-08-25 DIAGNOSIS — M54.16 RADICULOPATHY, LUMBAR REGION: ICD-10-CM

## 2020-08-25 DIAGNOSIS — Z86.79 PERSONAL HISTORY OF OTHER DISEASES OF THE CIRCULATORY SYSTEM: ICD-10-CM

## 2020-08-25 DIAGNOSIS — Z82.49 FAMILY HISTORY OF ISCHEMIC HEART DISEASE AND OTHER DISEASES OF THE CIRCULATORY SYSTEM: ICD-10-CM

## 2020-08-25 DIAGNOSIS — Z72.89 OTHER PROBLEMS RELATED TO LIFESTYLE: ICD-10-CM

## 2020-08-25 DIAGNOSIS — Z80.3 FAMILY HISTORY OF MALIGNANT NEOPLASM OF BREAST: ICD-10-CM

## 2020-08-25 DIAGNOSIS — Z87.09 PERSONAL HISTORY OF OTHER DISEASES OF THE RESPIRATORY SYSTEM: ICD-10-CM

## 2020-08-25 DIAGNOSIS — M48.062 SPINAL STENOSIS, LUMBAR REGION WITH NEUROGENIC CLAUDICATION: ICD-10-CM

## 2020-08-25 PROCEDURE — 99215 OFFICE O/P EST HI 40 MIN: CPT

## 2020-08-25 NOTE — REASON FOR VISIT
[New Patient Visit] : a new patient visit [Referred By: _________] : Patient was referred by SABRINA [FreeTextEntry1] : Lower back pain - Stenosis.. [facility].

## 2020-08-25 NOTE — HISTORY OF PRESENT ILLNESS
[de-identified] : Lena is a pleasant 75-year-old here for evaluation of her lumbar spine. She is accompanied by her daughter. She had several admissions to the hospital including [facility] for excruciating back and leg pain. She was admitted to [facility] with COPD exacerbation as well.She has symptoms that are classic for neurogenic claudication with a left-sided radiculopathy superimposed. Is not on any treatments actively for this. She's done some physical therapy. Because of Covid 19 her treatment options were limited. She has an MRI of the lumbar spine done at [facility] done in January of this year and she is using tramadol for pain. She is on anticoagulation.She has classic symptoms of neurogenic claudication but no symptoms of cauda equina compression.\par \par \par [facility]   LBP  treated and released  \par Severe LBP   and COPD    Kurt  [place]

## 2020-08-25 NOTE — RESULTS/DATA
[FreeTextEntry1] : \par Mark MRI of the lumbar spine reveals severe stenosis at L3-4 and L4-5 grade 1 spondylolisthesis at each of these levels. Incidentally is a severely degenerated disc at the T11-12 level.

## 2020-08-25 NOTE — PLAN
[FreeTextEntry1] : \par DIAGNOSIS:    LUMBAR  STENOSIS/DISK DEGENERATION/SPONDYLOLISTHESIS  WITH RADICULOPATHY L45 LEFT \par TREATMENT PLAN:  NON-SURGICAL  VS. LUMBAR DECOMPRESSION WITH INSTRUMENTED STABILIZATION AND FUSION   L3-5 \par \par This is a patient with degenerated lumbar disks with associated stenosis and spondylosis.  I have recommended nonsurgical management at this time.  This consists of physical therapy and/or manual medicine in conjunction to medical therapy and other conservative methods.  These include the consideration of trigger point injections and or the utilization of modalities such as TENS where applicable.  The next tier would be the referral to a pain management specialist (anesthesia or physiatry) for the consideration of spinal injections.  This includes the options of epidural steroids, facet injections as well as other novel techniques that may provide pain relief.  \par \par If all nonsurgical methods fail or there is neurological issue of concern, I have recommended lumbar decompression as a treatment option.  This entails removing the lamina and the medial facet joints along with the underlying hypertrophied ligamentum flavum.  This will allow for a widening of the spinal canal and the neuroforamen at the effected levels.  In doing so will likely result in added instability to the spine at this level requiring the need for instrumented stabilization and fusion.  This implies the use of pedicle screws and possibly interbody prosthetics to provide strength and anatomical alignment to the operated segments.  \par \par Risks and benefits of surgery were described to the patient in detail which include but not excluding those otherwise not mentioned:  coma paralysis, death, stroke, spinal fluid leak, nerve injury, weakness, infection, hardware malfunction/failure/mal-positioning requiring re-operation, vascular injury, nonunion/pseudoarthrosis, adjacent segment degeneration, and persistent pain.\par \par I have reviewed the images in detail with the patient today in my office and have answered all questions regarding this condition to the best of my ability to the patient’s satisfaction.She has multiple medical comorbidities including atrial fibrillation, anticoagulation, and COPD. This makes surgery certainly the last resort in terms of her treatment.\par \par

## 2020-08-25 NOTE — PHYSICAL EXAM
[Antalgic] : antalgic [Able to heel walk] : the patient was not able to heel walk [Able to toe walk] : the patient was not able to toe walk [Intact] : all sensory within normal limits bilaterally

## 2020-09-16 ENCOUNTER — APPOINTMENT (OUTPATIENT)
Dept: PULMONOLOGY | Facility: CLINIC | Age: 75
End: 2020-09-16

## 2020-11-04 ENCOUNTER — OUTPATIENT (OUTPATIENT)
Dept: OUTPATIENT SERVICES | Facility: HOSPITAL | Age: 75
LOS: 1 days | End: 2020-11-04

## 2020-11-04 DIAGNOSIS — Z90.49 ACQUIRED ABSENCE OF OTHER SPECIFIED PARTS OF DIGESTIVE TRACT: Chronic | ICD-10-CM

## 2020-11-09 ENCOUNTER — LABORATORY RESULT (OUTPATIENT)
Age: 75
End: 2020-11-09

## 2020-11-09 ENCOUNTER — APPOINTMENT (OUTPATIENT)
Dept: DISASTER EMERGENCY | Facility: CLINIC | Age: 75
End: 2020-11-09

## 2020-11-10 ENCOUNTER — APPOINTMENT (OUTPATIENT)
Dept: DISASTER EMERGENCY | Facility: CLINIC | Age: 75
End: 2020-11-10

## 2020-11-13 ENCOUNTER — APPOINTMENT (OUTPATIENT)
Dept: PULMONOLOGY | Facility: CLINIC | Age: 75
End: 2020-11-13
Payer: MEDICARE

## 2020-11-13 VITALS — HEART RATE: 68 BPM | SYSTOLIC BLOOD PRESSURE: 104 MMHG | DIASTOLIC BLOOD PRESSURE: 70 MMHG | OXYGEN SATURATION: 95 %

## 2020-11-13 VITALS — BODY MASS INDEX: 35.05 KG/M2 | WEIGHT: 167 LBS | HEIGHT: 58 IN

## 2020-11-13 PROCEDURE — 99214 OFFICE O/P EST MOD 30 MIN: CPT | Mod: 25

## 2020-11-13 PROCEDURE — 94010 BREATHING CAPACITY TEST: CPT

## 2020-11-13 RX ORDER — AZITHROMYCIN 250 MG/1
250 TABLET, FILM COATED ORAL
Qty: 1 | Refills: 2 | Status: DISCONTINUED | COMMUNITY
Start: 2020-03-10 | End: 2020-11-13

## 2020-11-13 NOTE — DISCUSSION/SUMMARY
[FreeTextEntry1] : Severe COPD with asthmatic component\par currently lung exam without bronchospasm , normal sp02\par Improved, CT scan without pneumonia or suspicious nodule 1/20\par Doing well on anoro, spirometry improved\par underlying PAF on full AC and spinal stenosis by hx\par action plan if change in status, needs asthma profile\par Cardiology following\par vaccinations u[p to date\par 5 months with spirometry or sooner if needed,\par

## 2020-11-13 NOTE — PROCEDURE
[FreeTextEntry1] : \par \par CT scan 1/20:  mild emphysema, granulomas, prom pulmonary artery\par spirometry with moderate air flow obstruction, improved from PB 1/20

## 2020-11-13 NOTE — PHYSICAL EXAM
[No Acute Distress] : no acute distress [Normal Oropharynx] : normal oropharynx [Normal Appearance] : normal appearance [No Neck Mass] : no neck mass [Normal Rate/Rhythm] : normal rate/rhythm [Normal S1, S2] : normal s1, s2 [No Murmurs] : no murmurs [No Resp Distress] : no resp distress [No Acc Muscle Use] : no acc muscle use [Normal to Percussion] : normal to percussion [No Abnormalities] : no abnormalities [Benign] : benign [Normal Gait] : normal gait [No Clubbing] : no clubbing [No Cyanosis] : no cyanosis [No Edema] : no edema [Normal Color/ Pigmentation] : normal color/ pigmentation [No Focal Deficits] : no focal deficits [Oriented x3] : oriented x3 [Normal Affect] : normal affect [TextBox_68] : moderate air entry bilat

## 2020-11-13 NOTE — HISTORY OF PRESENT ILLNESS
[TextBox_4] : 30 pack yr smoker, quit 30 yrs ago\par using anoro daily, \par now on Xarelto\par no chest pain or sputum\par no fever, chill, chest pain or sputum\par Dyspnea worse which she attributes to walking with cane and back pain\par recently saw Cardiology

## 2020-11-13 NOTE — CONSULT LETTER
[Dear  ___] : Dear  [unfilled], [Consult Letter:] : I had the pleasure of evaluating your patient, [unfilled]. [Please see my note below.] : Please see my note below. [Sincerely,] : Sincerely, [FreeTextEntry3] : [facility] DO [facility]\par Pulmonary Critical Care\par Director Pulmonary Division\par Medical Director Respiratory Therapy\par [facility]\par \par  [DrDaphne  ___] : Dr. BENNETT [DrDaphne ___] : Dr. BENNETT

## 2021-01-12 ENCOUNTER — TRANSCRIPTION ENCOUNTER (OUTPATIENT)
Age: 76
End: 2021-01-12

## 2021-01-14 ENCOUNTER — APPOINTMENT (OUTPATIENT)
Dept: DISASTER EMERGENCY | Facility: HOSPITAL | Age: 76
End: 2021-01-14

## 2021-01-14 ENCOUNTER — OUTPATIENT (OUTPATIENT)
Dept: INPATIENT UNIT | Facility: HOSPITAL | Age: 76
LOS: 1 days | End: 2021-01-14
Payer: MEDICARE

## 2021-01-14 VITALS
SYSTOLIC BLOOD PRESSURE: 150 MMHG | HEART RATE: 86 BPM | TEMPERATURE: 98 F | DIASTOLIC BLOOD PRESSURE: 85 MMHG | OXYGEN SATURATION: 98 % | RESPIRATION RATE: 19 BRPM

## 2021-01-14 VITALS
DIASTOLIC BLOOD PRESSURE: 92 MMHG | TEMPERATURE: 98 F | OXYGEN SATURATION: 96 % | SYSTOLIC BLOOD PRESSURE: 164 MMHG | HEART RATE: 86 BPM | RESPIRATION RATE: 18 BRPM

## 2021-01-14 DIAGNOSIS — Z90.49 ACQUIRED ABSENCE OF OTHER SPECIFIED PARTS OF DIGESTIVE TRACT: Chronic | ICD-10-CM

## 2021-01-14 DIAGNOSIS — U07.1 COVID-19: ICD-10-CM

## 2021-01-14 PROCEDURE — M0243: CPT

## 2021-01-14 NOTE — CHART NOTE - NSCHARTNOTEFT_GEN_A_CORE
CC: Monoclonal Antibody Infusion/COVID 19 Positive    74 YO female with PMH afib s/p cardioversion, aortic valve replacement 2015 (open) on xarelto, COPD not on o2, lumbar stenosis, HTN, HLD, cholecysectomy, presents with COVID-19. Pt tested positive on 1/6 after having negative rapid test. She has had general malaise for the past few days.      exam/findings:  T(C): 36.9 (01-14-21 @ 14:14), Max: 36.9 (01-14-21 @ 14:14)  HR: 86 (01-14-21 @ 14:14) (86 - 86)  BP: 164/92 (01-14-21 @ 14:14) (164/92 - 164/92)  RR: 18 (01-14-21 @ 14:14) (18 - 18)  SpO2: 96% (01-14-21 @ 14:14) (96% - 96%)      PE:   Appearance: NAD	  HEENT:   Normal oral mucosa  Lymphatic: No lymphadenopathy  Cardiovascular: Normal S1 S2, No JVD, No murmurs, No edema  Respiratory: Lungs clear to auscultation	B/L  Gastrointestinal:  Soft, Non-tender, + BS	  Skin: warm and dry  Neurologic: Non-focal  Extremities: Normal range of motion, no calf tenderness or edema      ASSESSMENT:  Pt is 74 YO female, Covid 19 Positive, who presents to infusion center for Monoclonal antibody infusion (Casirivimab/Imdevimab)  Symptoms/ Criteria: general malaise  Risk Profile includes: age over 65, cardiac history      PLAN:  - infusion procedure explained to patient   - consent for monoclonal antibody infusion obtained   - risk & benefits discussed/all questions answered  - infuse Casirivimab/Imdevimab 1200mg/1200mg IV over one hour   - observe patient for one hour post infusion        I have reviewed the Casirivimab/Imdevimab Emergency Use Authorization (EAU) and I have provided the patient or patient's caregiver with the following information:  1. FDA has authorized emergency use of Casirivimab/Imdevimab, which is not FDA-approved biologic product.  2. The patient or patient's caregiver has the option to accept or refuse administration of Casirivimab/Imdevimab  3. The significant known and benefits are unknown.  4. Information on available alternative treatments and risks and benefits of those alternatives.        Discharge:  Patient tolerated infusion well denies complaints of chest pain/SOB/dizziness/palpitations.  Vital signs --- for discharge home ---  D/C instructions given/ fact sheet included.  Patient to follow-up with PCP as needed. CC: Monoclonal Antibody Infusion/COVID 19 Positive    76 YO female with PMH afib s/p cardioversion, aortic valve replacement 2015 (open) on xarelto, COPD not on o2, lumbar stenosis, HTN, HLD, cholecysectomy, presents with COVID-19. Pt tested positive on 1/6 after having negative rapid test. She has had general malaise for the past few days.      exam/findings:  T(C): 36.9 (01-14-21 @ 14:14), Max: 36.9 (01-14-21 @ 14:14)  HR: 86 (01-14-21 @ 14:14) (86 - 86)  BP: 164/92 (01-14-21 @ 14:14) (164/92 - 164/92)  RR: 18 (01-14-21 @ 14:14) (18 - 18)  SpO2: 96% (01-14-21 @ 14:14) (96% - 96%)      PE:   Appearance: NAD	  HEENT:   Normal oral mucosa  Lymphatic: No lymphadenopathy  Cardiovascular: Normal S1 S2, No JVD, No murmurs, No edema  Respiratory: Lungs clear to auscultation	B/L  Gastrointestinal:  Soft, Non-tender, + BS	  Skin: warm and dry  Neurologic: Non-focal  Extremities: Normal range of motion, no calf tenderness or edema      ASSESSMENT:  Pt is 76 YO female, Covid 19 Positive, who presents to infusion center for Monoclonal antibody infusion (Casirivimab/Imdevimab)  Symptoms/ Criteria: general malaise  Risk Profile includes: age over 65, cardiac history      PLAN:  - infusion procedure explained to patient   - consent for monoclonal antibody infusion obtained   - risk & benefits discussed/all questions answered  - infuse Casirivimab/Imdevimab 1200mg/1200mg IV over one hour   - observe patient for one hour post infusion        I have reviewed the Casirivimab/Imdevimab Emergency Use Authorization (EAU) and I have provided the patient or patient's caregiver with the following information:  1. FDA has authorized emergency use of Casirivimab/Imdevimab, which is not FDA-approved biologic product.  2. The patient or patient's caregiver has the option to accept or refuse administration of Casirivimab/Imdevimab  3. The significant known and benefits are unknown.  4. Information on available alternative treatments and risks and benefits of those alternatives.        Discharge time 1630  Patient tolerated infusion well denies complaints of chest pain/SOB/dizziness/palpitations.  VSS.  D/C instructions given/ fact sheet included.  Patient to follow-up with PCP as needed.

## 2021-01-15 ENCOUNTER — TRANSCRIPTION ENCOUNTER (OUTPATIENT)
Age: 76
End: 2021-01-15

## 2021-01-19 ENCOUNTER — APPOINTMENT (OUTPATIENT)
Dept: DISASTER EMERGENCY | Facility: HOSPITAL | Age: 76
End: 2021-01-19

## 2021-01-19 ENCOUNTER — TRANSCRIPTION ENCOUNTER (OUTPATIENT)
Age: 76
End: 2021-01-19

## 2021-02-10 ENCOUNTER — APPOINTMENT (OUTPATIENT)
Dept: PULMONOLOGY | Facility: CLINIC | Age: 76
End: 2021-02-10
Payer: MEDICARE

## 2021-02-10 ENCOUNTER — RX CHANGE (OUTPATIENT)
Age: 76
End: 2021-02-10

## 2021-02-10 VITALS
DIASTOLIC BLOOD PRESSURE: 70 MMHG | OXYGEN SATURATION: 94 % | HEART RATE: 54 BPM | RESPIRATION RATE: 16 BRPM | SYSTOLIC BLOOD PRESSURE: 120 MMHG

## 2021-02-10 VITALS — WEIGHT: 157 LBS | BODY MASS INDEX: 32.81 KG/M2

## 2021-02-10 DIAGNOSIS — Z99.81 DEPENDENCE ON SUPPLEMENTAL OXYGEN: ICD-10-CM

## 2021-02-10 DIAGNOSIS — Z23 ENCOUNTER FOR IMMUNIZATION: ICD-10-CM

## 2021-02-10 PROCEDURE — 99214 OFFICE O/P EST MOD 30 MIN: CPT

## 2021-02-10 NOTE — CONSULT LETTER
[Dear  ___] : Dear  [unfilled], [Consult Letter:] : I had the pleasure of evaluating your patient, [unfilled]. [Please see my note below.] : Please see my note below. [Sincerely,] : Sincerely, [DrDaphne  ___] : Dr. BENNETT [DrDaphne ___] : Dr. BENNETT [FreeTextEntry3] : [facility]ahl DO [facility]\par Pulmonary Critical Care\par Director Pulmonary Division\par Medical Director Respiratory Therapy\par [facility]\par \par

## 2021-02-10 NOTE — PROCEDURE
[FreeTextEntry1] : CT scan 1/20:  mild emphysema, granulomas, prom pulmonary artery\par 11/20: moderate air flow obstruction, improved FVC and FEV1 from 1/20

## 2021-02-10 NOTE — HISTORY OF PRESENT ILLNESS
[TextBox_4] : 30 pack yr smoker, quit 30 yrs ago\par 1/12 Covid, had monoclonal Ab\par sinus congestion and body aches, no 02 problems, was checking daily\par using anoro daily, \par now on Xarelto\par no chest pain or sputum\par no fever, chill, chest pain or sputum\par Cardiology Dr Cardenas

## 2021-02-10 NOTE — DISCUSSION/SUMMARY
[FreeTextEntry1] : Severe COPD with asthmatic component, post covid January, had monoclonal AB\par Now at baseline, no acuter pulmonary complaints\par currently lung exam without bronchospasm , normal sp02\par Improved, CT scan without pneumonia or suspicious nodule 1/20, will obtain CXR\par Doing well on anoro, spirometry improved. add Flovent \par underlying PAF on full AC and spinal stenosis by hx\par action plan if change in status, needs asthma profile\par Cardiology following\par vaccinations u[p to date\par 3 months with spirometry or sooner if needed,\par

## 2021-02-11 RX ORDER — FLUTICASONE PROPIONATE 100 UG/1
100 POWDER, METERED RESPIRATORY (INHALATION) TWICE DAILY
Qty: 1 | Refills: 5 | Status: DISCONTINUED | COMMUNITY
Start: 2021-02-10 | End: 2021-02-11
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
## 2021-04-28 DIAGNOSIS — Z01.818 ENCOUNTER FOR OTHER PREPROCEDURAL EXAMINATION: ICD-10-CM

## 2021-04-29 ENCOUNTER — APPOINTMENT (OUTPATIENT)
Dept: DISASTER EMERGENCY | Facility: CLINIC | Age: 76
End: 2021-04-29

## 2021-04-30 LAB — SARS-COV-2 N GENE NPH QL NAA+PROBE: NOT DETECTED

## 2021-05-03 ENCOUNTER — APPOINTMENT (OUTPATIENT)
Dept: PULMONOLOGY | Facility: CLINIC | Age: 76
End: 2021-05-03
Payer: MEDICARE

## 2021-05-03 VITALS — DIASTOLIC BLOOD PRESSURE: 80 MMHG | SYSTOLIC BLOOD PRESSURE: 120 MMHG

## 2021-05-03 VITALS — TEMPERATURE: 97.2 F | WEIGHT: 160 LBS | BODY MASS INDEX: 33.58 KG/M2 | HEIGHT: 58 IN

## 2021-05-03 VITALS — HEART RATE: 82 BPM | RESPIRATION RATE: 16 BRPM | OXYGEN SATURATION: 96 %

## 2021-05-03 PROCEDURE — 94010 BREATHING CAPACITY TEST: CPT

## 2021-05-03 PROCEDURE — 99214 OFFICE O/P EST MOD 30 MIN: CPT | Mod: CS,25

## 2021-05-03 NOTE — DISCUSSION/SUMMARY
[FreeTextEntry1] :  COPD with asthmatic component, post covid January, had monoclonal AB\par Now closer to  baseline, still with some DOMINGUEZ\par currently lung exam without bronchospasm , normal sp02\par Improved, CT scan without pneumonia or suspicious nodule 1/20, negative  CXR\par Doing well on anoro, spirometry improved , Pulmicort added \par underlying PAF on full AC and spinal stenosis by hx\par action plan if change in status, needs asthma profile\par Cardiology status stable per pt\par vaccination discussed \par 3-4 months \par

## 2021-05-03 NOTE — CONSULT LETTER
[Dear  ___] : Dear  [unfilled], [Consult Letter:] : I had the pleasure of evaluating your patient, [unfilled]. [Please see my note below.] : Please see my note below. [Sincerely,] : Sincerely, [FreeTextEntry3] : [facility] DO [facility]\par Pulmonary Critical Care\par Director Pulmonary Division\par Medical Director Respiratory Therapy\par [facility]\par \par  [DrDaphne  ___] : Dr. BENNETT [DrDaphne ___] : Dr. BENNETT

## 2021-05-03 NOTE — PROCEDURE
[FreeTextEntry1] : CT scan 1/20:  mild emphysema, granulomas, prom pulmonary artery\par CXR 4/21 increased cor , clear lungs

## 2021-05-03 NOTE — HISTORY OF PRESENT ILLNESS
[TextBox_4] : 30 pack yr smoker, quit 30 yrs ago\par 1/12 Covid, had monoclonal Ab\par sinus congestion and body aches, no 02 problems, was checking daily\par using anoro daily, Pulmicort\par now on Xarelto\par no chest pain or sputum\par no fever, chill, chest pain or sputum\par Cardiology Dr Cardenas

## 2021-06-07 ENCOUNTER — TRANSCRIPTION ENCOUNTER (OUTPATIENT)
Age: 76
End: 2021-06-07

## 2021-08-18 ENCOUNTER — OUTPATIENT (OUTPATIENT)
Dept: OUTPATIENT SERVICES | Facility: HOSPITAL | Age: 76
LOS: 1 days | End: 2021-08-18

## 2021-08-18 DIAGNOSIS — Z90.49 ACQUIRED ABSENCE OF OTHER SPECIFIED PARTS OF DIGESTIVE TRACT: Chronic | ICD-10-CM

## 2021-09-15 ENCOUNTER — APPOINTMENT (OUTPATIENT)
Dept: PULMONOLOGY | Facility: CLINIC | Age: 76
End: 2021-09-15
Payer: MEDICARE

## 2021-09-15 VITALS — WEIGHT: 159 LBS | SYSTOLIC BLOOD PRESSURE: 140 MMHG | DIASTOLIC BLOOD PRESSURE: 80 MMHG | BODY MASS INDEX: 33.23 KG/M2

## 2021-09-15 VITALS — OXYGEN SATURATION: 96 % | RESPIRATION RATE: 16 BRPM | HEART RATE: 58 BPM

## 2021-09-15 DIAGNOSIS — Z86.16 PERSONAL HISTORY OF COVID-19: ICD-10-CM

## 2021-09-15 PROCEDURE — 99214 OFFICE O/P EST MOD 30 MIN: CPT

## 2021-09-15 RX ORDER — SERTRALINE HYDROCHLORIDE 100 MG/1
100 TABLET, FILM COATED ORAL DAILY
Refills: 0 | Status: ACTIVE | COMMUNITY
Start: 2020-01-21

## 2021-09-15 NOTE — DISCUSSION/SUMMARY
[FreeTextEntry1] :  COPD with asthmatic component, post covid January, had monoclonal AB\par at baseline, last CXR 4/21 negative\par currently lung exam without bronchospasm , normal sp02\par Doing well on anoro, Pulmicort- spirometry 5/21 improved, mild -moderate obstruction\par underlying PAF on full AC and spinal stenosis by hx\par action plan if change in status, needs asthma profile\par Cardiology status stable per pt\par vaccination discussed , had covid\par 6 months \par

## 2021-09-15 NOTE — CONSULT LETTER
[Dear  ___] : Dear  [unfilled], [Consult Letter:] : I had the pleasure of evaluating your patient, [unfilled]. [Please see my note below.] : Please see my note below. [Sincerely,] : Sincerely, [DrDaphne  ___] : Dr. BENNETT [DrDaphne ___] : Dr. BENNETT [FreeTextEntry3] : [facility] DO [facility]\par Pulmonary Critical Care\par Director Pulmonary Division\par Medical Director Respiratory Therapy\par [facility]\par \par

## 2021-09-15 NOTE — HISTORY OF PRESENT ILLNESS
[TextBox_4] : 30 pack yr smoker, quit 30 yrs ago\par 1/12 Covid, had monoclonal Ab\par sinus congestion and body aches, no 02 problems, was checking daily\par using anoro daily, Pulmicort\par  on Xarelto\par no chest pain or sputum\par no fever, chill, chest pain or sputum\par Cardiology Dr Cardenas

## 2021-10-06 PROBLEM — I10 ESSENTIAL HYPERTENSION: Status: ACTIVE | Noted: 2020-01-21

## 2021-12-08 ENCOUNTER — NON-APPOINTMENT (OUTPATIENT)
Age: 76
End: 2021-12-08

## 2021-12-08 ENCOUNTER — APPOINTMENT (OUTPATIENT)
Dept: OPHTHALMOLOGY | Facility: CLINIC | Age: 76
End: 2021-12-08
Payer: MEDICARE

## 2021-12-08 PROCEDURE — 92134 CPTRZ OPH DX IMG PST SGM RTA: CPT

## 2021-12-08 PROCEDURE — 92014 COMPRE OPH EXAM EST PT 1/>: CPT

## 2022-01-04 ENCOUNTER — APPOINTMENT (OUTPATIENT)
Dept: OPHTHALMOLOGY | Facility: CLINIC | Age: 77
End: 2022-01-04

## 2022-01-19 ENCOUNTER — APPOINTMENT (OUTPATIENT)
Dept: OPHTHALMOLOGY | Facility: HOSPITAL | Age: 77
End: 2022-01-19

## 2022-01-20 ENCOUNTER — APPOINTMENT (OUTPATIENT)
Dept: OPHTHALMOLOGY | Facility: CLINIC | Age: 77
End: 2022-01-20

## 2022-01-26 ENCOUNTER — APPOINTMENT (OUTPATIENT)
Dept: OPHTHALMOLOGY | Facility: CLINIC | Age: 77
End: 2022-01-26

## 2022-03-08 ENCOUNTER — NON-APPOINTMENT (OUTPATIENT)
Age: 77
End: 2022-03-08

## 2022-03-08 ENCOUNTER — APPOINTMENT (OUTPATIENT)
Dept: OPHTHALMOLOGY | Facility: CLINIC | Age: 77
End: 2022-03-08
Payer: MEDICARE

## 2022-03-08 PROCEDURE — 92136 OPHTHALMIC BIOMETRY: CPT

## 2022-03-08 PROCEDURE — 92014 COMPRE OPH EXAM EST PT 1/>: CPT

## 2022-03-15 ENCOUNTER — NON-APPOINTMENT (OUTPATIENT)
Age: 77
End: 2022-03-15

## 2022-03-15 ENCOUNTER — APPOINTMENT (OUTPATIENT)
Dept: PULMONOLOGY | Facility: CLINIC | Age: 77
End: 2022-03-15
Payer: MEDICARE

## 2022-03-15 VITALS
RESPIRATION RATE: 16 BRPM | HEART RATE: 86 BPM | WEIGHT: 160 LBS | SYSTOLIC BLOOD PRESSURE: 118 MMHG | DIASTOLIC BLOOD PRESSURE: 78 MMHG | BODY MASS INDEX: 33.44 KG/M2 | OXYGEN SATURATION: 96 %

## 2022-03-15 PROCEDURE — 99214 OFFICE O/P EST MOD 30 MIN: CPT

## 2022-03-15 RX ORDER — AZITHROMYCIN 250 MG/1
250 TABLET, FILM COATED ORAL
Qty: 1 | Refills: 2 | Status: DISCONTINUED | COMMUNITY
Start: 2021-09-15 | End: 2022-03-15

## 2022-03-15 RX ORDER — PREDNISONE 10 MG/1
10 TABLET ORAL
Qty: 30 | Refills: 4 | Status: DISCONTINUED | COMMUNITY
Start: 2021-09-15 | End: 2022-03-15

## 2022-03-15 NOTE — HISTORY OF PRESENT ILLNESS
[TextBox_4] : 30 pack yr smoker, quit 30 yrs ago\par 1/12 Covid, had monoclonal Ab\par using anoro daily, Pulmicort\par had bruising, now off Xarelto- no fever, chill, chest pain\par no chest pain or sputum\par Dyspnea at baseline\par Cardiology Dr Cardenas

## 2022-03-15 NOTE — CONSULT LETTER
[Dear  ___] : Dear  [unfilled], [Consult Letter:] : I had the pleasure of evaluating your patient, [unfilled]. [Please see my note below.] : Please see my note below. [Sincerely,] : Sincerely, [DrDaphne  ___] : Dr. BENNETT [DrDaphne ___] : Dr. BENNETT [FreeTextEntry3] : Ori Dahl DO [facility]\par Pulmonary Critical Care\par Director Pulmonary Division\par Medical Director Respiratory Therapy\par [facility]\par \par

## 2022-03-15 NOTE — DISCUSSION/SUMMARY
[FreeTextEntry1] :  COPD with asthmatic component,quit smoking 30 yrs ago, A fib on full AC,Spinal stenosis by hx\par At baseline, no acute pulmonary complaints\par currently lung exam without bronchospasm , normal sp02\par Improved, CT scan without pneumonia or suspicious nodule 1/20, negative  CXR 4/21\par Doing well on anoro, spirometry improved , Pulmicort added \par action plan if change in status, needs asthma profile\par Cardiology status stable per pt\par vaccination discussed , covid up to date\par 5-6  months or sooner if needed , repeat CXR and spirometry\par

## 2022-03-16 ENCOUNTER — APPOINTMENT (OUTPATIENT)
Dept: OPHTHALMOLOGY | Facility: HOSPITAL | Age: 77
End: 2022-03-16
Payer: MEDICARE

## 2022-03-16 PROCEDURE — 66984 XCAPSL CTRC RMVL W/O ECP: CPT | Mod: LT

## 2022-03-17 ENCOUNTER — NON-APPOINTMENT (OUTPATIENT)
Age: 77
End: 2022-03-17

## 2022-03-17 ENCOUNTER — APPOINTMENT (OUTPATIENT)
Dept: OPHTHALMOLOGY | Facility: CLINIC | Age: 77
End: 2022-03-17
Payer: MEDICARE

## 2022-03-17 PROCEDURE — 99024 POSTOP FOLLOW-UP VISIT: CPT

## 2022-03-23 ENCOUNTER — NON-APPOINTMENT (OUTPATIENT)
Age: 77
End: 2022-03-23

## 2022-03-23 ENCOUNTER — APPOINTMENT (OUTPATIENT)
Dept: OPHTHALMOLOGY | Facility: CLINIC | Age: 77
End: 2022-03-23
Payer: MEDICARE

## 2022-03-23 PROCEDURE — 99024 POSTOP FOLLOW-UP VISIT: CPT

## 2022-04-13 ENCOUNTER — APPOINTMENT (OUTPATIENT)
Dept: OPHTHALMOLOGY | Facility: CLINIC | Age: 77
End: 2022-04-13
Payer: MEDICARE

## 2022-04-13 ENCOUNTER — NON-APPOINTMENT (OUTPATIENT)
Age: 77
End: 2022-04-13

## 2022-04-13 PROCEDURE — 99024 POSTOP FOLLOW-UP VISIT: CPT

## 2022-05-10 ENCOUNTER — RESULT REVIEW (OUTPATIENT)
Age: 77
End: 2022-05-10

## 2022-07-13 ENCOUNTER — APPOINTMENT (OUTPATIENT)
Dept: OPHTHALMOLOGY | Facility: CLINIC | Age: 77
End: 2022-07-13

## 2022-07-13 ENCOUNTER — NON-APPOINTMENT (OUTPATIENT)
Age: 77
End: 2022-07-13

## 2022-07-13 PROCEDURE — 92134 CPTRZ OPH DX IMG PST SGM RTA: CPT

## 2022-07-13 PROCEDURE — 92014 COMPRE OPH EXAM EST PT 1/>: CPT

## 2022-08-03 ENCOUNTER — EMERGENCY (EMERGENCY)
Facility: HOSPITAL | Age: 77
LOS: 1 days | Discharge: HOME | End: 2022-08-03
Attending: EMERGENCY MEDICINE
Payer: MEDICARE

## 2022-08-03 ENCOUNTER — RX RENEWAL (OUTPATIENT)
Age: 77
End: 2022-08-03

## 2022-08-03 VITALS
DIASTOLIC BLOOD PRESSURE: 89 MMHG | OXYGEN SATURATION: 97 % | TEMPERATURE: 98 F | RESPIRATION RATE: 16 BRPM | HEART RATE: 79 BPM | WEIGHT: 149.91 LBS | SYSTOLIC BLOOD PRESSURE: 147 MMHG | HEIGHT: 58 IN

## 2022-08-03 VITALS
TEMPERATURE: 98 F | OXYGEN SATURATION: 93 % | DIASTOLIC BLOOD PRESSURE: 88 MMHG | HEART RATE: 88 BPM | SYSTOLIC BLOOD PRESSURE: 151 MMHG

## 2022-08-03 DIAGNOSIS — Z90.49 ACQUIRED ABSENCE OF OTHER SPECIFIED PARTS OF DIGESTIVE TRACT: Chronic | ICD-10-CM

## 2022-08-03 LAB
ALBUMIN SERPL ELPH-MCNC: 3.8 G/DL — SIGNIFICANT CHANGE UP (ref 3.3–5.2)
ALP SERPL-CCNC: 92 U/L — SIGNIFICANT CHANGE UP (ref 40–120)
ALT FLD-CCNC: 18 U/L — SIGNIFICANT CHANGE UP
ANION GAP SERPL CALC-SCNC: 11 MMOL/L — SIGNIFICANT CHANGE UP (ref 5–17)
APTT BLD: 32.4 SEC — SIGNIFICANT CHANGE UP (ref 27.5–35.5)
AST SERPL-CCNC: 53 U/L — HIGH
BASOPHILS # BLD AUTO: 0.07 K/UL — SIGNIFICANT CHANGE UP (ref 0–0.2)
BASOPHILS NFR BLD AUTO: 0.7 % — SIGNIFICANT CHANGE UP (ref 0–2)
BILIRUB SERPL-MCNC: 0.7 MG/DL — SIGNIFICANT CHANGE UP (ref 0.4–2)
BUN SERPL-MCNC: 17.6 MG/DL — SIGNIFICANT CHANGE UP (ref 8–20)
CALCIUM SERPL-MCNC: 9.1 MG/DL — SIGNIFICANT CHANGE UP (ref 8.4–10.5)
CHLORIDE SERPL-SCNC: 105 MMOL/L — SIGNIFICANT CHANGE UP (ref 98–107)
CO2 SERPL-SCNC: 23 MMOL/L — SIGNIFICANT CHANGE UP (ref 22–29)
CREAT SERPL-MCNC: 0.75 MG/DL — SIGNIFICANT CHANGE UP (ref 0.5–1.3)
EGFR: 81 ML/MIN/1.73M2 — SIGNIFICANT CHANGE UP
EOSINOPHIL # BLD AUTO: 0.23 K/UL — SIGNIFICANT CHANGE UP (ref 0–0.5)
EOSINOPHIL NFR BLD AUTO: 2.2 % — SIGNIFICANT CHANGE UP (ref 0–6)
GLUCOSE SERPL-MCNC: 104 MG/DL — HIGH (ref 70–99)
HCT VFR BLD CALC: 45.7 % — HIGH (ref 34.5–45)
HGB BLD-MCNC: 15 G/DL — SIGNIFICANT CHANGE UP (ref 11.5–15.5)
IMM GRANULOCYTES NFR BLD AUTO: 0.7 % — SIGNIFICANT CHANGE UP (ref 0–1.5)
INR BLD: 1.33 RATIO — HIGH (ref 0.88–1.16)
LYMPHOCYTES # BLD AUTO: 0.93 K/UL — LOW (ref 1–3.3)
LYMPHOCYTES # BLD AUTO: 8.9 % — LOW (ref 13–44)
MCHC RBC-ENTMCNC: 28.9 PG — SIGNIFICANT CHANGE UP (ref 27–34)
MCHC RBC-ENTMCNC: 32.8 GM/DL — SIGNIFICANT CHANGE UP (ref 32–36)
MCV RBC AUTO: 88.1 FL — SIGNIFICANT CHANGE UP (ref 80–100)
MONOCYTES # BLD AUTO: 0.6 K/UL — SIGNIFICANT CHANGE UP (ref 0–0.9)
MONOCYTES NFR BLD AUTO: 5.8 % — SIGNIFICANT CHANGE UP (ref 2–14)
NEUTROPHILS # BLD AUTO: 8.51 K/UL — HIGH (ref 1.8–7.4)
NEUTROPHILS NFR BLD AUTO: 81.7 % — HIGH (ref 43–77)
PLATELET # BLD AUTO: 180 K/UL — SIGNIFICANT CHANGE UP (ref 150–400)
POTASSIUM SERPL-MCNC: 5.2 MMOL/L — SIGNIFICANT CHANGE UP (ref 3.5–5.3)
POTASSIUM SERPL-SCNC: 5.2 MMOL/L — SIGNIFICANT CHANGE UP (ref 3.5–5.3)
PROT SERPL-MCNC: 8.2 G/DL — SIGNIFICANT CHANGE UP (ref 6.6–8.7)
PROTHROM AB SERPL-ACNC: 15.5 SEC — HIGH (ref 10.5–13.4)
RBC # BLD: 5.19 M/UL — SIGNIFICANT CHANGE UP (ref 3.8–5.2)
RBC # FLD: 14.6 % — HIGH (ref 10.3–14.5)
SODIUM SERPL-SCNC: 139 MMOL/L — SIGNIFICANT CHANGE UP (ref 135–145)
TROPONIN T SERPL-MCNC: 0.02 NG/ML — SIGNIFICANT CHANGE UP (ref 0–0.06)
WBC # BLD: 10.41 K/UL — SIGNIFICANT CHANGE UP (ref 3.8–10.5)
WBC # FLD AUTO: 10.41 K/UL — SIGNIFICANT CHANGE UP (ref 3.8–10.5)

## 2022-08-03 PROCEDURE — 36415 COLL VENOUS BLD VENIPUNCTURE: CPT

## 2022-08-03 PROCEDURE — 85730 THROMBOPLASTIN TIME PARTIAL: CPT

## 2022-08-03 PROCEDURE — 80053 COMPREHEN METABOLIC PANEL: CPT

## 2022-08-03 PROCEDURE — G1004: CPT

## 2022-08-03 PROCEDURE — 85025 COMPLETE CBC W/AUTO DIFF WBC: CPT

## 2022-08-03 PROCEDURE — 70450 CT HEAD/BRAIN W/O DYE: CPT | Mod: MG

## 2022-08-03 PROCEDURE — 85610 PROTHROMBIN TIME: CPT

## 2022-08-03 PROCEDURE — 70450 CT HEAD/BRAIN W/O DYE: CPT | Mod: 26,MG

## 2022-08-03 PROCEDURE — 99284 EMERGENCY DEPT VISIT MOD MDM: CPT

## 2022-08-03 PROCEDURE — 84484 ASSAY OF TROPONIN QUANT: CPT

## 2022-08-03 PROCEDURE — 99284 EMERGENCY DEPT VISIT MOD MDM: CPT | Mod: 25

## 2022-08-03 RX ORDER — TETANUS TOXOID, REDUCED DIPHTHERIA TOXOID AND ACELLULAR PERTUSSIS VACCINE, ADSORBED 5; 2.5; 8; 8; 2.5 [IU]/.5ML; [IU]/.5ML; UG/.5ML; UG/.5ML; UG/.5ML
0.5 SUSPENSION INTRAMUSCULAR ONCE
Refills: 0 | Status: COMPLETED | OUTPATIENT
Start: 2022-08-03 | End: 2022-08-03

## 2022-08-03 NOTE — ED PROVIDER NOTE - NS_EDPROVIDERDISPOUSERTYPE_ED_A_ED
Pt going to ultrasound   Scribe Attestation (For Scribes USE Only)... Attending Attestation (For Attendings USE Only).../Scribe Attestation (For Scribes USE Only)...

## 2022-08-03 NOTE — ED PROVIDER NOTE - OBJECTIVE STATEMENT
78 y/o female with PMHx of a-fib on a/c present to ed s/p fall. pt states her shoe got stuck on floor and she feel backwards, denies loc, and brought to ED for evaluation.

## 2022-08-03 NOTE — ED PROVIDER NOTE - PATIENT PORTAL LINK FT
You can access the FollowMyHealth Patient Portal offered by [facility] by registering at the following website: [email or web address]. By joining [employer]’s FollowMyHealth portal, you will also be able to view your health information using other applications (apps) compatible with our system.

## 2022-08-03 NOTE — ED ADULT TRIAGE NOTE - CHIEF COMPLAINT QUOTE
Pt has been seeing pain mgt for her knees. She states today she felt weak, lightheaded and her knees gave out causing her to fall back and hit her head. Pt on Xarelto

## 2022-08-03 NOTE — ED PROVIDER NOTE - NSICDXPASTMEDICALHX_GEN_ALL_CORE_FT
PAST MEDICAL HISTORY:  A-fib     Aortic valve replaced     Atrial fibrillation     COPD (chronic obstructive pulmonary disease)     COPD (chronic obstructive pulmonary disease)

## 2022-08-03 NOTE — ED PROVIDER NOTE - CARDIAC, MLM
Normal rate, regular rhythm.  Heart sounds S1, S2.  No murmurs, rubs or gallops. regular rate, irregular rhythm

## 2022-08-11 ENCOUNTER — NON-APPOINTMENT (OUTPATIENT)
Age: 77
End: 2022-08-11

## 2022-08-16 ENCOUNTER — APPOINTMENT (OUTPATIENT)
Dept: PULMONOLOGY | Facility: CLINIC | Age: 77
End: 2022-08-16

## 2022-08-16 VITALS — HEIGHT: 58 IN | WEIGHT: 152 LBS | BODY MASS INDEX: 31.91 KG/M2

## 2022-08-16 VITALS — OXYGEN SATURATION: 96 %

## 2022-08-16 VITALS
RESPIRATION RATE: 16 BRPM | DIASTOLIC BLOOD PRESSURE: 74 MMHG | HEART RATE: 72 BPM | SYSTOLIC BLOOD PRESSURE: 122 MMHG | OXYGEN SATURATION: 94 %

## 2022-08-16 PROCEDURE — 94010 BREATHING CAPACITY TEST: CPT

## 2022-08-16 PROCEDURE — 99214 OFFICE O/P EST MOD 30 MIN: CPT | Mod: 25

## 2022-08-16 NOTE — PROCEDURE
[FreeTextEntry1] : CT scan 1/20:  mild emphysema, granulomas, prom pulmonary artery\par CXR 4/21 increased cor , clear lungs\par \par Spirometry mild air flow obstruction, no change from 5/21\par \par CXR 8/22 no active disease

## 2022-08-16 NOTE — DISCUSSION/SUMMARY
[FreeTextEntry1] :  COPD with asthmatic component\par problems with cataracts, and recent mechanical fall at home\par currently lung exam without bronchospasm , normal sp02\par Doing well on anoro, spirometry mild obstruction stable , Using Pulmicort infrequently\par underlying PAF on full AC and spinal stenosis by hx\par CXR 8/22 no acute pathology\par Cardiology following and aware of falls\par discussed walker use, physical therapy and follow up with PMD\par 6  months or sooner if needed\par

## 2022-08-16 NOTE — HISTORY OF PRESENT ILLNESS
[TextBox_4] : 30 pack yr smoker, quit 30 yrs ago\par seeing ophthalmologist for cataracts\par fell at home, mechanical had head CT, Cardiology aware\par using anoro daily, Pulmicort\par Remains  on Xarelto\par no chest pain or sputum\par no fever, chill, chest pain or sputum\par Cardiology Dr Cardenas

## 2022-08-16 NOTE — CONSULT LETTER
[Dear  ___] : Dear  [unfilled], [Consult Letter:] : I had the pleasure of evaluating your patient, [unfilled]. [Please see my note below.] : Please see my note below. [Sincerely,] : Sincerely, [FreeTextEntry3] : [facility] DO [facility]\par Pulmonary Critical Care\par Director Pulmonary Division\par Medical Director Respiratory Therapy\par [facility]\par \par  [DrDaphne  ___] : Dr. BENNETT [DrDaphne ___] : Dr. BENNETT

## 2022-09-02 ENCOUNTER — NON-APPOINTMENT (OUTPATIENT)
Age: 77
End: 2022-09-02

## 2022-11-16 NOTE — END OF VISIT
[>50% of Time Spent on Counseling and Coordination of Care for  ___] : Greater than 50% of the encounter time was spent on counseling and coordination of care for [unfilled] [Time Spent: ___ minutes] : I have spent [unfilled] minutes of face to face time with the patient 29

## 2022-12-14 ENCOUNTER — NON-APPOINTMENT (OUTPATIENT)
Age: 77
End: 2022-12-14

## 2022-12-14 ENCOUNTER — APPOINTMENT (OUTPATIENT)
Dept: OPHTHALMOLOGY | Facility: CLINIC | Age: 77
End: 2022-12-14

## 2022-12-14 PROCEDURE — 92134 CPTRZ OPH DX IMG PST SGM RTA: CPT

## 2022-12-14 PROCEDURE — 92014 COMPRE OPH EXAM EST PT 1/>: CPT

## 2023-01-26 ENCOUNTER — NON-APPOINTMENT (OUTPATIENT)
Age: 78
End: 2023-01-26

## 2023-02-23 ENCOUNTER — APPOINTMENT (OUTPATIENT)
Dept: PULMONOLOGY | Facility: CLINIC | Age: 78
End: 2023-02-23
Payer: MEDICARE

## 2023-02-23 VITALS
HEIGHT: 59 IN | RESPIRATION RATE: 16 BRPM | WEIGHT: 145 LBS | BODY MASS INDEX: 29.23 KG/M2 | DIASTOLIC BLOOD PRESSURE: 74 MMHG | SYSTOLIC BLOOD PRESSURE: 132 MMHG

## 2023-02-23 VITALS — OXYGEN SATURATION: 96 % | HEART RATE: 76 BPM

## 2023-02-23 PROCEDURE — 99213 OFFICE O/P EST LOW 20 MIN: CPT

## 2023-02-23 RX ORDER — BUDESONIDE 90 UG/1
90 AEROSOL, POWDER RESPIRATORY (INHALATION)
Qty: 6 | Refills: 3 | Status: DISCONTINUED | COMMUNITY
Start: 2021-02-11 | End: 2023-02-23

## 2023-02-23 RX ORDER — DONEPEZIL HYDROCHLORIDE 10 MG/1
10 TABLET ORAL
Refills: 0 | Status: ACTIVE | COMMUNITY

## 2023-02-23 NOTE — CONSULT LETTER
[Dear  ___] : Dear  [unfilled], [Consult Letter:] : I had the pleasure of evaluating your patient, [unfilled]. [Please see my note below.] : Please see my note below. [Sincerely,] : Sincerely, [DrDaphne  ___] : Dr. BENNETT [DrDaphne ___] : Dr. BENNETT [FreeTextEntry3] : [facility] DO [facility]\par Pulmonary Critical Care\par Director Pulmonary Division\par Medical Director Respiratory Therapy\par [facility]\par \par

## 2023-02-23 NOTE — HISTORY OF PRESENT ILLNESS
[TextBox_4] : 30 pack yr smoker, quit 30 yrs ago\par using anoro daily, off Pulmicort\par Remains  on Xarelto, PAF\par no chest pain or sputum\par no fever, chill, chest pain or sputum\par Cardiology Dr Cardenas, echo planned 6 months

## 2023-02-23 NOTE — DISCUSSION/SUMMARY
[FreeTextEntry1] :  COPD with asthmatic component, currently slightly better than baseline\par Using Anoro daily, denies benefit of Pulmicort addition so she stopped\par currently lung exam without bronchospasm , normal sp02\par Doing well on anoro, spirometry mild obstruction stable , will repeat at follow up\par underlying PAF on full AC and spinal stenosis by hx\par CXR 8/22 no acute pathology, quit smoking 1990s\par Cardiology following , no recent falls, using rolling walker\par 4  months or sooner if needed with spirometry\par

## 2023-05-05 ENCOUNTER — INPATIENT (INPATIENT)
Facility: HOSPITAL | Age: 78
LOS: 2 days | Discharge: HOME | DRG: 394 | End: 2023-05-08
Admitting: HOSPITALIST
Payer: MEDICARE

## 2023-05-05 VITALS
DIASTOLIC BLOOD PRESSURE: 84 MMHG | OXYGEN SATURATION: 98 % | RESPIRATION RATE: 16 BRPM | HEART RATE: 77 BPM | TEMPERATURE: 98 F | SYSTOLIC BLOOD PRESSURE: 150 MMHG

## 2023-05-05 DIAGNOSIS — K62.5 HEMORRHAGE OF ANUS AND RECTUM: ICD-10-CM

## 2023-05-05 DIAGNOSIS — Z90.49 ACQUIRED ABSENCE OF OTHER SPECIFIED PARTS OF DIGESTIVE TRACT: Chronic | ICD-10-CM

## 2023-05-05 LAB
ALBUMIN SERPL ELPH-MCNC: 4 G/DL — SIGNIFICANT CHANGE UP (ref 3.3–5.2)
ALP SERPL-CCNC: 89 U/L — SIGNIFICANT CHANGE UP (ref 40–120)
ALT FLD-CCNC: 15 U/L — SIGNIFICANT CHANGE UP
ANION GAP SERPL CALC-SCNC: 13 MMOL/L — SIGNIFICANT CHANGE UP (ref 5–17)
ANION GAP SERPL CALC-SCNC: 13 MMOL/L — SIGNIFICANT CHANGE UP (ref 5–17)
APTT BLD: 30.1 SEC — SIGNIFICANT CHANGE UP (ref 27.5–35.5)
AST SERPL-CCNC: 41 U/L — HIGH
BASOPHILS # BLD AUTO: 0.08 K/UL — SIGNIFICANT CHANGE UP (ref 0–0.2)
BASOPHILS NFR BLD AUTO: 0.6 % — SIGNIFICANT CHANGE UP (ref 0–2)
BILIRUB SERPL-MCNC: 0.6 MG/DL — SIGNIFICANT CHANGE UP (ref 0.4–2)
BLD GP AB SCN SERPL QL: SIGNIFICANT CHANGE UP
BUN SERPL-MCNC: 19.1 MG/DL — SIGNIFICANT CHANGE UP (ref 8–20)
BUN SERPL-MCNC: 22.1 MG/DL — HIGH (ref 8–20)
CALCIUM SERPL-MCNC: 9 MG/DL — SIGNIFICANT CHANGE UP (ref 8.4–10.5)
CALCIUM SERPL-MCNC: 9.5 MG/DL — SIGNIFICANT CHANGE UP (ref 8.4–10.5)
CHLORIDE SERPL-SCNC: 104 MMOL/L — SIGNIFICANT CHANGE UP (ref 96–108)
CHLORIDE SERPL-SCNC: 105 MMOL/L — SIGNIFICANT CHANGE UP (ref 96–108)
CO2 SERPL-SCNC: 23 MMOL/L — SIGNIFICANT CHANGE UP (ref 22–29)
CO2 SERPL-SCNC: 24 MMOL/L — SIGNIFICANT CHANGE UP (ref 22–29)
CREAT SERPL-MCNC: 0.71 MG/DL — SIGNIFICANT CHANGE UP (ref 0.5–1.3)
CREAT SERPL-MCNC: 0.85 MG/DL — SIGNIFICANT CHANGE UP (ref 0.5–1.3)
EGFR: 70 ML/MIN/1.73M2 — SIGNIFICANT CHANGE UP
EGFR: 86 ML/MIN/1.73M2 — SIGNIFICANT CHANGE UP
EOSINOPHIL # BLD AUTO: 0.15 K/UL — SIGNIFICANT CHANGE UP (ref 0–0.5)
EOSINOPHIL NFR BLD AUTO: 1.1 % — SIGNIFICANT CHANGE UP (ref 0–6)
GLUCOSE SERPL-MCNC: 158 MG/DL — HIGH (ref 70–99)
GLUCOSE SERPL-MCNC: 198 MG/DL — HIGH (ref 70–99)
HCT VFR BLD CALC: 36.3 % — SIGNIFICANT CHANGE UP (ref 34.5–45)
HCT VFR BLD CALC: 37.5 % — SIGNIFICANT CHANGE UP (ref 34.5–45)
HCT VFR BLD CALC: 41.3 % — SIGNIFICANT CHANGE UP (ref 34.5–45)
HGB BLD-MCNC: 12 G/DL — SIGNIFICANT CHANGE UP (ref 11.5–15.5)
HGB BLD-MCNC: 12 G/DL — SIGNIFICANT CHANGE UP (ref 11.5–15.5)
HGB BLD-MCNC: 12.9 G/DL — SIGNIFICANT CHANGE UP (ref 11.5–15.5)
IMM GRANULOCYTES NFR BLD AUTO: 0.6 % — SIGNIFICANT CHANGE UP (ref 0–0.9)
INR BLD: 1.22 RATIO — HIGH (ref 0.88–1.16)
LYMPHOCYTES # BLD AUTO: 0.84 K/UL — LOW (ref 1–3.3)
LYMPHOCYTES # BLD AUTO: 6.4 % — LOW (ref 13–44)
MANUAL SMEAR VERIFICATION: SIGNIFICANT CHANGE UP
MCHC RBC-ENTMCNC: 27.2 PG — SIGNIFICANT CHANGE UP (ref 27–34)
MCHC RBC-ENTMCNC: 27.6 PG — SIGNIFICANT CHANGE UP (ref 27–34)
MCHC RBC-ENTMCNC: 28.4 PG — SIGNIFICANT CHANGE UP (ref 27–34)
MCHC RBC-ENTMCNC: 31.2 GM/DL — LOW (ref 32–36)
MCHC RBC-ENTMCNC: 32 GM/DL — SIGNIFICANT CHANGE UP (ref 32–36)
MCHC RBC-ENTMCNC: 33.1 GM/DL — SIGNIFICANT CHANGE UP (ref 32–36)
MCV RBC AUTO: 85.8 FL — SIGNIFICANT CHANGE UP (ref 80–100)
MCV RBC AUTO: 86.2 FL — SIGNIFICANT CHANGE UP (ref 80–100)
MCV RBC AUTO: 87.1 FL — SIGNIFICANT CHANGE UP (ref 80–100)
MONOCYTES # BLD AUTO: 0.5 K/UL — SIGNIFICANT CHANGE UP (ref 0–0.9)
MONOCYTES NFR BLD AUTO: 3.8 % — SIGNIFICANT CHANGE UP (ref 2–14)
NEUTROPHILS # BLD AUTO: 11.54 K/UL — HIGH (ref 1.8–7.4)
NEUTROPHILS NFR BLD AUTO: 87.5 % — HIGH (ref 43–77)
PLAT MORPH BLD: NORMAL — SIGNIFICANT CHANGE UP
PLATELET # BLD AUTO: 148 K/UL — LOW (ref 150–400)
PLATELET # BLD AUTO: 158 K/UL — SIGNIFICANT CHANGE UP (ref 150–400)
PLATELET # BLD AUTO: 160 K/UL — SIGNIFICANT CHANGE UP (ref 150–400)
POTASSIUM SERPL-MCNC: 4 MMOL/L — SIGNIFICANT CHANGE UP (ref 3.5–5.3)
POTASSIUM SERPL-MCNC: 4.9 MMOL/L — SIGNIFICANT CHANGE UP (ref 3.5–5.3)
POTASSIUM SERPL-SCNC: 4 MMOL/L — SIGNIFICANT CHANGE UP (ref 3.5–5.3)
POTASSIUM SERPL-SCNC: 4.9 MMOL/L — SIGNIFICANT CHANGE UP (ref 3.5–5.3)
PROT SERPL-MCNC: 7 G/DL — SIGNIFICANT CHANGE UP (ref 6.6–8.7)
PROTHROM AB SERPL-ACNC: 14.2 SEC — HIGH (ref 10.5–13.4)
RBC # BLD: 4.23 M/UL — SIGNIFICANT CHANGE UP (ref 3.8–5.2)
RBC # BLD: 4.35 M/UL — SIGNIFICANT CHANGE UP (ref 3.8–5.2)
RBC # BLD: 4.74 M/UL — SIGNIFICANT CHANGE UP (ref 3.8–5.2)
RBC # FLD: 14.4 % — SIGNIFICANT CHANGE UP (ref 10.3–14.5)
RBC # FLD: 14.5 % — SIGNIFICANT CHANGE UP (ref 10.3–14.5)
RBC # FLD: 14.6 % — HIGH (ref 10.3–14.5)
RBC BLD AUTO: NORMAL — SIGNIFICANT CHANGE UP
SODIUM SERPL-SCNC: 140 MMOL/L — SIGNIFICANT CHANGE UP (ref 135–145)
SODIUM SERPL-SCNC: 142 MMOL/L — SIGNIFICANT CHANGE UP (ref 135–145)
TROPONIN T SERPL-MCNC: <0.01 NG/ML — SIGNIFICANT CHANGE UP (ref 0–0.06)
WBC # BLD: 10.44 K/UL — SIGNIFICANT CHANGE UP (ref 3.8–10.5)
WBC # BLD: 13.19 K/UL — HIGH (ref 3.8–10.5)
WBC # BLD: 8.37 K/UL — SIGNIFICANT CHANGE UP (ref 3.8–10.5)
WBC # FLD AUTO: 10.44 K/UL — SIGNIFICANT CHANGE UP (ref 3.8–10.5)
WBC # FLD AUTO: 13.19 K/UL — HIGH (ref 3.8–10.5)
WBC # FLD AUTO: 8.37 K/UL — SIGNIFICANT CHANGE UP (ref 3.8–10.5)

## 2023-05-05 PROCEDURE — 93010 ELECTROCARDIOGRAM REPORT: CPT

## 2023-05-05 PROCEDURE — G1004: CPT

## 2023-05-05 PROCEDURE — 71045 X-RAY EXAM CHEST 1 VIEW: CPT | Mod: 26

## 2023-05-05 PROCEDURE — 99223 1ST HOSP IP/OBS HIGH 75: CPT

## 2023-05-05 PROCEDURE — 99285 EMERGENCY DEPT VISIT HI MDM: CPT

## 2023-05-05 PROCEDURE — 99497 ADVNCD CARE PLAN 30 MIN: CPT | Mod: 25

## 2023-05-05 PROCEDURE — 74178 CT ABD&PLV WO CNTR FLWD CNTR: CPT | Mod: 26,MG

## 2023-05-05 RX ORDER — SODIUM CHLORIDE 9 MG/ML
1000 INJECTION INTRAMUSCULAR; INTRAVENOUS; SUBCUTANEOUS
Refills: 0 | Status: DISCONTINUED | OUTPATIENT
Start: 2023-05-05 | End: 2023-05-08

## 2023-05-05 RX ORDER — DONEPEZIL HYDROCHLORIDE 10 MG/1
1 TABLET, FILM COATED ORAL
Refills: 0 | DISCHARGE

## 2023-05-05 RX ORDER — DONEPEZIL HYDROCHLORIDE 10 MG/1
10 TABLET, FILM COATED ORAL AT BEDTIME
Refills: 0 | Status: DISCONTINUED | OUTPATIENT
Start: 2023-05-05 | End: 2023-05-08

## 2023-05-05 RX ORDER — LANOLIN ALCOHOL/MO/W.PET/CERES
3 CREAM (GRAM) TOPICAL AT BEDTIME
Refills: 0 | Status: DISCONTINUED | OUTPATIENT
Start: 2023-05-05 | End: 2023-05-08

## 2023-05-05 RX ORDER — PANTOPRAZOLE SODIUM 20 MG/1
40 TABLET, DELAYED RELEASE ORAL
Refills: 0 | Status: DISCONTINUED | OUTPATIENT
Start: 2023-05-05 | End: 2023-05-05

## 2023-05-05 RX ORDER — METOPROLOL TARTRATE 50 MG
1 TABLET ORAL
Refills: 0 | DISCHARGE

## 2023-05-05 RX ORDER — ATORVASTATIN CALCIUM 80 MG/1
40 TABLET, FILM COATED ORAL AT BEDTIME
Refills: 0 | Status: DISCONTINUED | OUTPATIENT
Start: 2023-05-05 | End: 2023-05-08

## 2023-05-05 RX ORDER — ONDANSETRON 8 MG/1
4 TABLET, FILM COATED ORAL EVERY 8 HOURS
Refills: 0 | Status: DISCONTINUED | OUTPATIENT
Start: 2023-05-05 | End: 2023-05-08

## 2023-05-05 RX ORDER — SERTRALINE 25 MG/1
1 TABLET, FILM COATED ORAL
Refills: 0 | DISCHARGE

## 2023-05-05 RX ORDER — METOPROLOL TARTRATE 50 MG
1 TABLET ORAL
Qty: 0 | Refills: 0 | DISCHARGE

## 2023-05-05 RX ORDER — SODIUM CHLORIDE 9 MG/ML
500 INJECTION INTRAMUSCULAR; INTRAVENOUS; SUBCUTANEOUS ONCE
Refills: 0 | Status: COMPLETED | OUTPATIENT
Start: 2023-05-05 | End: 2023-05-05

## 2023-05-05 RX ORDER — TIOTROPIUM BROMIDE 18 UG/1
2 CAPSULE ORAL; RESPIRATORY (INHALATION) DAILY
Refills: 0 | Status: DISCONTINUED | OUTPATIENT
Start: 2023-05-05 | End: 2023-05-08

## 2023-05-05 RX ORDER — UMECLIDINIUM BROMIDE AND VILANTEROL TRIFENATATE 62.5; 25 UG/1; UG/1
1 POWDER RESPIRATORY (INHALATION)
Refills: 0 | DISCHARGE

## 2023-05-05 RX ORDER — BUDESONIDE AND FORMOTEROL FUMARATE DIHYDRATE 160; 4.5 UG/1; UG/1
2 AEROSOL RESPIRATORY (INHALATION)
Refills: 0 | Status: DISCONTINUED | OUTPATIENT
Start: 2023-05-05 | End: 2023-05-08

## 2023-05-05 RX ORDER — APIXABAN 2.5 MG/1
1 TABLET, FILM COATED ORAL
Qty: 0 | Refills: 0 | DISCHARGE

## 2023-05-05 RX ORDER — RIVAROXABAN 15 MG-20MG
1 KIT ORAL
Refills: 0 | DISCHARGE

## 2023-05-05 RX ORDER — SERTRALINE 25 MG/1
1 TABLET, FILM COATED ORAL
Qty: 0 | Refills: 0 | DISCHARGE

## 2023-05-05 RX ORDER — SERTRALINE 25 MG/1
150 TABLET, FILM COATED ORAL DAILY
Refills: 0 | Status: DISCONTINUED | OUTPATIENT
Start: 2023-05-05 | End: 2023-05-08

## 2023-05-05 RX ORDER — LOSARTAN POTASSIUM 100 MG/1
100 TABLET, FILM COATED ORAL DAILY
Refills: 0 | Status: DISCONTINUED | OUTPATIENT
Start: 2023-05-05 | End: 2023-05-08

## 2023-05-05 RX ORDER — OXYCODONE HYDROCHLORIDE 5 MG/1
1 TABLET ORAL
Qty: 0 | Refills: 0 | DISCHARGE

## 2023-05-05 RX ORDER — METOPROLOL TARTRATE 50 MG
100 TABLET ORAL DAILY
Refills: 0 | Status: DISCONTINUED | OUTPATIENT
Start: 2023-05-05 | End: 2023-05-08

## 2023-05-05 RX ORDER — ACETAMINOPHEN 500 MG
650 TABLET ORAL EVERY 6 HOURS
Refills: 0 | Status: DISCONTINUED | OUTPATIENT
Start: 2023-05-05 | End: 2023-05-08

## 2023-05-05 RX ORDER — PANTOPRAZOLE SODIUM 20 MG/1
40 TABLET, DELAYED RELEASE ORAL EVERY 12 HOURS
Refills: 0 | Status: DISCONTINUED | OUTPATIENT
Start: 2023-05-05 | End: 2023-05-08

## 2023-05-05 RX ADMIN — DONEPEZIL HYDROCHLORIDE 10 MILLIGRAM(S): 10 TABLET, FILM COATED ORAL at 21:25

## 2023-05-05 RX ADMIN — SODIUM CHLORIDE 75 MILLILITER(S): 9 INJECTION INTRAMUSCULAR; INTRAVENOUS; SUBCUTANEOUS at 09:49

## 2023-05-05 RX ADMIN — PANTOPRAZOLE SODIUM 40 MILLIGRAM(S): 20 TABLET, DELAYED RELEASE ORAL at 17:55

## 2023-05-05 RX ADMIN — ATORVASTATIN CALCIUM 40 MILLIGRAM(S): 80 TABLET, FILM COATED ORAL at 21:25

## 2023-05-05 RX ADMIN — PANTOPRAZOLE SODIUM 40 MILLIGRAM(S): 20 TABLET, DELAYED RELEASE ORAL at 08:21

## 2023-05-05 RX ADMIN — SODIUM CHLORIDE 500 MILLILITER(S): 9 INJECTION INTRAMUSCULAR; INTRAVENOUS; SUBCUTANEOUS at 02:59

## 2023-05-05 RX ADMIN — Medication 0.25 MILLIGRAM(S): at 13:00

## 2023-05-05 NOTE — ED PROVIDER NOTE - EKG #1 DATE/TIME
[FreeTextEntry1] : Mrs. Noel is a 36 y/o female w/ PMH significant for MIGUEL, Sulfa allergies/allergic rhinitis, AUB s/p abdominal supracervical hysterectomy and sacral colpopexy (2019), who presented to the clinic for establishment of care.\par \par # Fatigue\par - given hx of MIGUEL and B12 deficiency, will acquire CBC and B12/folate level.\par - thyroid function test as clinton has some unintentional weight gain along with fatigue.\par - given her body habitus and snoring, she may have sleep apnea. Referral for sleep specialist given, yet will discuss regarding making appointment after labs come back. \par - discussed regarding sleep hygiene.\par \par # Obesity\par - Discussed regarding lifestyle modification, though as a mother of five, who lives alone, it is difficult\par - will try lifestyle modification first. Not interested in referral yet. \par \par # HCM\par - flu shot today\par - Tdap today\par - PHQ2 = 1\par - A1c and Lipid profile, CMP  
05-May-2023 02:42

## 2023-05-05 NOTE — H&P ADULT - ASSESSMENT
78 yo female with pmhx  A. fib on Xarelto, Bovine AVR, COPD, HTN, HLD presenting to the ED with complaint of rectal bleeding x 1 day.    Acute GI Bleeding  -Admit to medicine  -CTA without any extravasation of contrast/signs of acute bleeding  -Per ED exam, maroon colored stool in rectal vault  -Hgb stable 12.9 -> 12.0  -Start Protonix 40 mg PO daily for GI protection  -Keep NPO pending GI evaluation for possible EGD  -Monitor CBC  -Transfuse for Hgb < 7.0     A. Fib, HTN  -Continue Metoprolol 100 mg daily  -Hold Xarelto for now pending further GIB workup    HLD  -Continue Atorvastatin 40 mg daily as sub for Rosuvastatin 10 mg daily    COPD  -Continue Symbicort/Spiriva as sub for Anoro Ellipta      DVTppx: SCDs  GOC: 78 yo female with pmhx  A. fib on Xarelto, Bovine AVR, COPD, HTN, HLD presenting to the ED with complaint of rectal bleeding x 1 day.    Acute GI Bleeding, possibly 2/2 Stomach Ulcer  -Admit to medicine  -CTA without any extravasation of contrast/signs of acute bleeding  -Per ED exam, maroon colored stool in rectal vault  -Hgb stable 12.9 -> 12.0  -Protonix 40 mg PO daily for GI protection  -HOLD Xarelto and Meloxicam   -Keep NPO pending GI evaluation for possible EGD  -Monitor CBC  -Transfuse for Hgb < 7.0     A. Fib, HTN  -Continue Metoprolol 100 mg daily  -Hold Xarelto for now pending further GIB workup  -Continue Losartan 100 mg daily in place of Olmesartan 40 mg daily  -Hold parameters for antihypertensives    HLD  -Continue Atorvastatin 40 mg daily as sub for Rosuvastatin 10 mg daily    COPD  -Continue Symbicort/Spiriva as sub for Anoro Ellipta      Depression, Mild Dementia  -Patient AAOx4   -Continue Sertraline 150 mg daily and Donepezil 10 mg daily    DVTppx: SCDs  GOC: Full Code

## 2023-05-05 NOTE — ED PROVIDER NOTE - CLINICAL SUMMARY MEDICAL DECISION MAKING FREE TEXT BOX
77y F w hx Afib on Xarelto, presents for rectal bleeding. Hemodynamically stable. No active bleed on CT. GI consult placed. Admitted to medicine.

## 2023-05-05 NOTE — PATIENT PROFILE ADULT - FALL HARM RISK - RISK INTERVENTIONS

## 2023-05-05 NOTE — H&P ADULT - NSHPLABSRESULTS_GEN_ALL_CORE
CARDIAC MARKERS ( 05 May 2023 02:37 )  x     / <0.01 ng/mL / x     / x     / x                                12.0   10.44 )-----------( 148      ( 05 May 2023 05:15 )             37.5     05 May 2023 02:37    142    |  105    |  22.1   ----------------------------<  198    4.9     |  24.0   |  0.85     Ca    9.5        05 May 2023 02:37    TPro  7.0    /  Alb  4.0    /  TBili  0.6    /  DBili  x      /  AST  41     /  ALT  15     /  AlkPhos  89     05 May 2023 02:37      CAPILLARY BLOOD GLUCOSE        LIVER FUNCTIONS - ( 05 May 2023 02:37 )  Alb: 4.0 g/dL / Pro: 7.0 g/dL / ALK PHOS: 89 U/L / ALT: 15 U/L / AST: 41 U/L / GGT: x             < from: CT Abdomen and Pelvis w/wo IV Cont (05.05.23 @ 04:15) >    IMPRESSION:  No evidence of active gastrointestinal bleeding. Colonic diverticulosis   without evidence of diverticulitis.    Thickening of the walls of the gastric fundus and body which are similar   in appearance to the prior exam from 12/29/2019. Nonemergent endoscopy   should be considered to further evaluate.        --- End of Report ---    < end of copied text >

## 2023-05-05 NOTE — ED ADULT TRIAGE NOTE - GLASGOW COMA SCALE: EYE OPENING, MLM
Telephone Encounter by Rafael Kelsey RN at 03/23/17 04:28 PM     Author:  Rafael Kelsey RN Service:  (none) Author Type:  Registered Nurse     Filed:  03/23/17 04:29 PM Encounter Date:  3/23/2017 Status:  Signed     :  Rafael Kelsey RN (Registered Nurse)            Patient will be notified of Dr. Vick's response when its received[BC1.1M]       Revision History        User Key Date/Time User Provider Type Action    > BC1.1 03/23/17 04:29 PM Rafael Kelsey RN Registered Nurse Sign    M - Manual             (E4) spontaneous

## 2023-05-05 NOTE — ED ADULT TRIAGE NOTE - CHIEF COMPLAINT QUOTE
BIBEMS from home reporting rectal bleeding that started this morning, has 3 or 4 episodes, then subsided for the day. After dinner had a few other episodes and decided to come to get seen. takes Xarelto for afib/heart valve replacement. Reports dizziness after she used the bathroom last.

## 2023-05-05 NOTE — H&P ADULT - CONVERSATION DETAILS
Discussed the treatment options in the event that patient were to go into cardiac arrest including chest compressions and intubation. Explain in detail what this entails. Patient states she does not want to "live on machines". I explained that to goal with CPR is to get patients off any ventilators after stabilization, but that we can not predict what the outcome will be in the moment. Explained the role of a HCP in making decisions when the patient cannot. Patient would like all measures to be taken to attempt resuscitation. I explained to her that this is a decision that can be changed at any time. Verbalized understanding and consent.

## 2023-05-05 NOTE — CONSULT NOTE ADULT - SUBJECTIVE AND OBJECTIVE BOX
Patient is a 77y old  Female who presents with a chief complaint of GI Bleed (05 May 2023 05:51)      HPI:  78 yo female with PMHx A. fib on Xarelto, Bovine AVR, COPD, HTN, HLD presenting to the ED with complaint of rectal bleeding x 1 day. She noted this started Thursday 5/4 in the am with bright red blood and clots, and she had 3 separate episodes that day. Daughter states, however, when she saw it that it look more maroon colored to her. Patient has no hx of GI bleed. She does take Xarelto 20 mg daily and has been on Meloxicam 15 mg daily for "a long time" for bursitis. Patient held her Xarelto yesterday, last dose 5/3 evening. She denies abdominal pain, but endorses some dizziness with positional change and mild nausea. In ED, Hemoglobin stable at 12.0gm, INR 1.22. CT of abdomen and pelvis revealed No evidence of active gastrointestinal bleeding. Colonic diverticulosis without evidence of diverticulitis. Thickening of the walls of the gastric fundus and body which are similar in appearance to the prior exam from 12/29/2019. Rectal exam showed small amount of dark burgundy stool. At this time, Denies nausea, vomiting, abdominal pain, fever, chills, chest pain, shortness of breath.         PAST MEDICAL & SURGICAL HISTORY:  Atrial fibrillation      COPD (chronic obstructive pulmonary disease)      Aortic valve replaced      COPD (chronic obstructive pulmonary disease)      S/P cholecystectomy      S/P appendectomy          Allergies    No Known Allergies    Intolerances        MEDICATIONS  (STANDING):  atorvastatin 40 milliGRAM(s) Oral at bedtime  budesonide  80 MICROgram(s)/formoterol 4.5 MICROgram(s) Inhaler 2 Puff(s) Inhalation two times a day  donepezil 10 milliGRAM(s) Oral at bedtime  losartan 100 milliGRAM(s) Oral daily  metoprolol succinate  milliGRAM(s) Oral daily  pantoprazole    Tablet 40 milliGRAM(s) Oral before breakfast  sertraline 150 milliGRAM(s) Oral daily  sodium chloride 0.9%. 1000 milliLiter(s) (75 mL/Hr) IV Continuous <Continuous>  tiotropium 2.5 MICROgram(s) Inhaler 2 Puff(s) Inhalation daily    MEDICATIONS  (PRN):  acetaminophen     Tablet .. 650 milliGRAM(s) Oral every 6 hours PRN Temp greater or equal to 38C (100.4F), Mild Pain (1 - 3)  aluminum hydroxide/magnesium hydroxide/simethicone Suspension 30 milliLiter(s) Oral every 4 hours PRN Dyspepsia  melatonin 3 milliGRAM(s) Oral at bedtime PRN Insomnia  ondansetron Injectable 4 milliGRAM(s) IV Push every 8 hours PRN Nausea and/or Vomiting      Social History:    Marital Status:  (   )    (   ) Single    (   )    (  )   Occupation:   Lives with: (  ) alone  (  ) children   (  ) spouse   (  ) parents  (  ) other    Substance Use (street drugs): (  ) never used  (  ) other:  Tobacco Usage:  (   ) never smoked   ( quit-25 years ago-1PPD  ) former smoker   (   ) current smoker  (     ) pack year  (        ) last cigarette date  Alcohol Usage:   Sexual History:     Family History   IBD (  ) Yes   (  ) No  GI Malignancy (  )  Yes    (  ) No    Health Management     Last Colonoscopy - Denies       (     ) Advanced Directives: (     ) None    (      ) DNR    (     ) DNI    (     ) Health Care Proxy:       REVIEW OF SYSTEMS:   General: Negative  HEENT: Negative  CV: Negative  Respiratory: Negative  GI: See HPI  : Negative  MSK: Negative  Hematologic: Negative  Skin: Negative      Vital Signs Last 24 Hrs  T(C): 36.7 (05 May 2023 07:29), Max: 36.7 (05 May 2023 07:29)  T(F): 98 (05 May 2023 07:29), Max: 98 (05 May 2023 07:29)  HR: 99 (05 May 2023 08:45) (76 - 99)  BP: 152/82 (05 May 2023 08:45) (147/80 - 152/87)  BP(mean): --  RR: 18 (05 May 2023 08:10) (16 - 18)  SpO2: 99% (05 May 2023 08:45) (95% - 99%)    Parameters below as of 05 May 2023 08:10  Patient On (Oxygen Delivery Method): room air        PHYSICAL EXAM:   GENERAL:  No acute distress  HEENT:  NC/AT, conjunctiva clear, sclera anicteric  CHEST:  No increased effort  HEART:  Regular rhythm  ABDOMEN:  Soft, non-tender, non-distended, normoactive bowel sounds  EXTREMITIES: No edema  SKIN:  Warm, dry  NEURO:  Calm, cooperative  MICHAEL: Dark burgundy stool         LABS:                        12.0   10.44 )-----------( 148      ( 05 May 2023 05:15 )             37.5     05-05    140  |  104  |  19.1  ----------------------------<  158<H>  4.0   |  23.0  |  0.71    Ca    9.0      05 May 2023 06:37    TPro  7.0  /  Alb  4.0  /  TBili  0.6  /  DBili  x   /  AST  41<H>  /  ALT  15  /  AlkPhos  89  05-05    PT/INR - ( 05 May 2023 06:37 )   PT: 14.2 sec;   INR: 1.22 ratio         PTT - ( 05 May 2023 06:37 )  PTT:30.1 sec    LIVER FUNCTIONS - ( 05 May 2023 02:37 )  Alb: 4.0 g/dL / Pro: 7.0 g/dL / ALK PHOS: 89 U/L / ALT: 15 U/L / AST: 41 U/L / GGT: x             RADIOLOGY & ADDITIONAL TESTS:      < from: CT Abdomen and Pelvis w/wo IV Cont (05.05.23 @ 04:15) >    ACC: [id number] EXAM:  CT ABDOMEN AND PELVIS WAW IC   ORDERED BY: ANDREW SOSA       PROCEDURE DATE:  05/05/2023        FINDINGS:  LOWER CHEST: Platelike atelectasis at the lung bases. Heart is enlarged.   Coronary artery and thoracic aortic atherosclerotic calcifications.   Calcifications of the mitral annulus.    LIVER: Within normal limits.  BILE DUCTS: Normal caliber.  GALLBLADDER: Surgically absent.  SPLEEN: Within normal limits.  PANCREAS: Within normal limits.  ADRENALS: Within normal limits.  KIDNEYS/URETERS: Within normal limits.    BLADDER: Within normal limits.  REPRODUCTIVE ORGANS: Uterus and adnexa are unremarkable.    BOWEL: No bowel obstruction. Appendix is is not visualized. Colonic   diverticulosis without evidence of diverticulitis. Thickening of the   walls of the gastric fundus and body which is similar in appearance to   the prior exam from 12/29/2019. No extravasation of vascular contrast   into the enteric lumen to suggest active gastrointestinal bleeding.  PERITONEUM: No ascites, pneumoperitoneum, or loculated collection. No   mesenteric lymphadenopathy.  VESSELS: Atherosclerotic calcifications of the aortoiliac tree. Normal   caliber abdominal aorta. Patent celiac artery, SMA, renal arteries, and   EVANS.  RETROPERITONEUM/LYMPH NODES: No lymphadenopathy.  ABDOMINAL WALL: Small fat-containing umbilical hernia.  BONES: Multilevel degenerative changes of the spine.    IMPRESSION:  No evidence of active gastrointestinal bleeding. Colonic diverticulosis   without evidence of diverticulitis.    Thickening of the walls of the gastric fundus and body which are similar   in appearance to the prior exam from 12/29/2019. Nonemergent endoscopy   should be considered to further evaluate.        --- End of Report ---        < end of copied text >

## 2023-05-05 NOTE — ED ADULT NURSE REASSESSMENT NOTE - NS ED NURSE REASSESS COMMENT FT1
appx 5 beat run of V-Tach noted on monitor. rhythm strip printed and scanned into chart. Patient sleeping during event, awakens to voice and offers no CP or SOB. MD Reece contacted and notified of event, instructed to continue telemetry monitoring
A&OX3. Reports having blood in stool yesterday and proceeded to have rectal bleeding. States she felt nauseous and dizzy. Aware of plan of care. Daughter at bedside.

## 2023-05-05 NOTE — ED PROVIDER NOTE - NS ED ROS FT
Constitutional: no fever, no chills  Eyes: no vision changes  ENT: no nasal congestion, no sore throat  CV: no chest pain  Resp: no cough, no shortness of breath  GI: no abdominal pain, no vomiting, no diarrhea, +rectal bleeding  : no dysuria  MSK: no joint pain  Skin: no rash  Neuro: no headache, no focal weakness, no paresthesias

## 2023-05-05 NOTE — ED PROVIDER NOTE - PHYSICAL EXAMINATION
Constitutional: Awake, alert, in no acute distress  Eyes: no scleral icterus  HENT: normocephalic, atraumatic, moist oral mucosa  Neck: supple  CV: RRR, no murmur  Pulm: non-labored respirations, CTAB  Abdomen: soft, non-tender, non-distended  Rectal: No masses, no tenderness. +Maroon-colored stool in vault. Chaperoned by ZOIE Luna.  Extremities: no edema, no deformity  Skin: no rash, no jaundice  Neuro: AAOx3, moving all extremities equally

## 2023-05-05 NOTE — ED PROVIDER NOTE - OBJECTIVE STATEMENT
77y F w/ hx Afib on Xarelto, s/p bovine AVR, COPD, HTN, HLD; presents for rectal bleeding. Pt reports onset yesterday of bright red rectal bleeding with clots. No abdominal pain. No fever, chest pain, SOB. Complains of feeling generally weak. Denies prior hx of GI bleed. Has never had a colonoscopy. Last took Xarelto on Wednesday night.

## 2023-05-05 NOTE — CONSULT NOTE ADULT - ASSESSMENT
78 yo female with PMHx A. fib on Xarelto, Bovine AVR, COPD, HTN, HLD presenting to the ED with complaint of rectal bleeding x 1 day.     Rectal bleeding   Possibly secondary to diverticular bleed in the setting of Xarelto (last dose 5/3)  CT of A/P revealed No evidence of active gastrointestinal bleeding. Colonic diverticulosis without evidence of diverticulitis. Thickening of the walls of the gastric fundus and body which are similar in appearance to the prior exam from 12/29/2019.  Hgb stable 12.9 -> 12.0 INR 1.22. Rectal exam showed small amount of dark burgundy stool.   - Plan for possible EGD/Colonoscopy On Monday   - Will need to be clear liquid diet Sunday. NPO after midnight Sunday 5/7 @ 4533.   - Golytely bowel prep for Sunday   - Maintain current T&S, INR <1.5, Hgb >7.0, Plt >50 prior to procedure.  - Hold AC for procedure   - Continue to trend CBC, transfuse as needed    76 yo female with PMHx A. fib on Xarelto, Bovine AVR, COPD, HTN, HLD presenting to the ED with complaint of rectal bleeding x 1 day.     Rectal bleeding   Possibly secondary to diverticular bleed in the setting of Xarelto (last dose 5/3)  CT of A/P revealed No evidence of active gastrointestinal bleeding. Colonic diverticulosis without evidence of diverticulitis. Thickening of the walls of the gastric fundus and body which are similar in appearance to the prior exam from 12/29/2019.  Hgb stable 12.9 -> 12.0 INR 1.22. Rectal exam showed small amount of dark burgundy stool.   - Plan for possible EGD/Colonoscopy On Monday.   - Will need to be clear liquid diet Sunday. NPO after midnight Sunday 5/7 @ 0037.   - Golytely bowel prep for Sunday   - Maintain current T&S, INR <1.5, Hgb >7.0, Plt >50 prior to procedure.  - Hold AC for procedure   - Continue to trend CBC, transfuse as needed   - Plan discussed with patient and daughter at bedside

## 2023-05-05 NOTE — H&P ADULT - NSHPPHYSICALEXAM_GEN_ALL_CORE
Vital Signs Last 24 Hrs  T(C): 36.5 (05 May 2023 05:24), Max: 36.5 (05 May 2023 01:37)  T(F): 97.7 (05 May 2023 05:24), Max: 97.7 (05 May 2023 01:37)  HR: 78 (05 May 2023 05:24) (77 - 78)  BP: 150/72 (05 May 2023 05:24) (150/72 - 150/84)  BP(mean): --  RR: 16 (05 May 2023 01:37) (16 - 16)  SpO2: 97% (05 May 2023 05:24) (97% - 98%)    Parameters below as of 05 May 2023 01:37  Patient On (Oxygen Delivery Method): room air    General: Age-appearing, in no acute distress  Head: Normocephalic, atraumatic  ENMT: EOMI, neck supple  Cardiovascular: +S1, S2; Regular rate and rhythm, no murmurs, rubs, gallops  Respiratory: CTA BL, no wheezes, rales, rhonchi  Gastrointestinal: Abdomen soft, non-tender, +BS in all 4 quadrants  Extremities: No clubbing, cyanosis, or edema  Vascular: 2+ pulses, cap refill < 2 seconds  Neuro: Non-focal, AAOx4, sensation intact BL  Musculoskeletal: Normal tone, no deformities  Skin: Warm, dry; no acute rash seen  Psych: Appropriate, cooperative Vital Signs Last 24 Hrs  T(C): 36.5 (05 May 2023 05:24), Max: 36.5 (05 May 2023 01:37)  T(F): 97.7 (05 May 2023 05:24), Max: 97.7 (05 May 2023 01:37)  HR: 78 (05 May 2023 05:24) (77 - 78)  BP: 150/72 (05 May 2023 05:24) (150/72 - 150/84)  BP(mean): --  RR: 16 (05 May 2023 01:37) (16 - 16)  SpO2: 97% (05 May 2023 05:24) (97% - 98%)    Parameters below as of 05 May 2023 01:37  Patient On (Oxygen Delivery Method): room air    General: Age-appearing, in no acute distress  Head: Normocephalic, atraumatic  ENMT: EOMI, neck supple  Cardiovascular: +S1, S2; irregularly irregular rhythm, normal rate, no murmurs, rubs, gallops  Respiratory: CTA BL, no wheezes, rales, rhonchi  Gastrointestinal: Abdomen soft, non-tender, +BS in all 4 quadrants  Extremities: No clubbing, cyanosis, or edema  Vascular: 2+ pulses, cap refill < 2 seconds  Neuro: Non-focal, AAOx4, sensation intact BL  Musculoskeletal: Normal tone, no deformities  Skin: Warm, dry; no acute rash seen  Psych: Appropriate, cooperative

## 2023-05-05 NOTE — H&P ADULT - HISTORY OF PRESENT ILLNESS
78 yo female with pmhx . fib on Xarelto, Bovine AVR, COPD, HTN, HLD presenting to the ED with complaint of rectal bleeding x 1 day. She noted this started in the am with bright red blood and clots. 76 yo female with pmhx A. fib on Xarelto, Bovine AVR, COPD, HTN, HLD presenting to the ED with complaint of rectal bleeding x 1 day. She noted this started Thursday 5/4 in the am with bright red blood and clots, and she had 3 separate episodes that day. Daughter states, however, when she saw it that it look more maroon colored to her. Patient has no hx of GI bleed. She does take Xarelto 20 mg daily and has been on Meloxicam 15 mg daily for "a long time" for bursitis. Patient held her Xarelto yesterday, last dose 5/3 evening. She denies abdominal pain, but endorses some dizziness with positional change and mild nausea.

## 2023-05-05 NOTE — CONSULT NOTE ADULT - NS ATTEST RISK PROBLEM GEN_ALL_CORE FT
Reviewing chart and speaking with the pt. and her daughter as well as the pt's hospitalist. Risk of rebleeding is high given that she is on chronic Xarelto therapy for AF. Reviewing chart and speaking with the pt. and her daughter as well as the pt's hospitalist. Risk of rebleeding is high given that she is on chronic Xarelto therapy for AF. I then went back to the pt's bedside to speak with another of the pt's daughters who is the pt's HCP in addition to speaking to the daughter I initially spoke to. I reiterated that the pt. can leave the hospital if she absolutely refuses to stay until Monday but would have to leave AMA. I spent a total of 70 minutes with this pt. and her family including the re-visit to the pt's bedside to speak with both daughters.

## 2023-05-05 NOTE — H&P ADULT - NSICDXPASTMEDICALHX_GEN_ALL_CORE_FT
PAST MEDICAL HISTORY:  Aortic valve replaced     Atrial fibrillation     COPD (chronic obstructive pulmonary disease)     COPD (chronic obstructive pulmonary disease)

## 2023-05-05 NOTE — PATIENT PROFILE ADULT - IS THERE A SUSPICION OF ABUSE/NEGLIGENCE?
Patient's depression is recurrent and is moderate without psychosis. Their depression is currently active and the condition is improving with treatment. This will be reassessed at the next regular appointment. F/U as described:patient will continue current medication therapy.   no

## 2023-05-06 LAB
ALBUMIN SERPL ELPH-MCNC: 3.6 G/DL — SIGNIFICANT CHANGE UP (ref 3.3–5.2)
ALP SERPL-CCNC: 72 U/L — SIGNIFICANT CHANGE UP (ref 40–120)
ALT FLD-CCNC: 10 U/L — SIGNIFICANT CHANGE UP
ANION GAP SERPL CALC-SCNC: 12 MMOL/L — SIGNIFICANT CHANGE UP (ref 5–17)
AST SERPL-CCNC: 27 U/L — SIGNIFICANT CHANGE UP
BASOPHILS # BLD AUTO: 0.06 K/UL — SIGNIFICANT CHANGE UP (ref 0–0.2)
BASOPHILS NFR BLD AUTO: 0.8 % — SIGNIFICANT CHANGE UP (ref 0–2)
BILIRUB SERPL-MCNC: 0.7 MG/DL — SIGNIFICANT CHANGE UP (ref 0.4–2)
BUN SERPL-MCNC: 11.5 MG/DL — SIGNIFICANT CHANGE UP (ref 8–20)
CALCIUM SERPL-MCNC: 8.8 MG/DL — SIGNIFICANT CHANGE UP (ref 8.4–10.5)
CHLORIDE SERPL-SCNC: 106 MMOL/L — SIGNIFICANT CHANGE UP (ref 96–108)
CO2 SERPL-SCNC: 23 MMOL/L — SIGNIFICANT CHANGE UP (ref 22–29)
CREAT SERPL-MCNC: 0.7 MG/DL — SIGNIFICANT CHANGE UP (ref 0.5–1.3)
EGFR: 88 ML/MIN/1.73M2 — SIGNIFICANT CHANGE UP
EOSINOPHIL # BLD AUTO: 0.24 K/UL — SIGNIFICANT CHANGE UP (ref 0–0.5)
EOSINOPHIL NFR BLD AUTO: 3.3 % — SIGNIFICANT CHANGE UP (ref 0–6)
GLUCOSE SERPL-MCNC: 88 MG/DL — SIGNIFICANT CHANGE UP (ref 70–99)
HCT VFR BLD CALC: 34.4 % — LOW (ref 34.5–45)
HCT VFR BLD CALC: 36.3 % — SIGNIFICANT CHANGE UP (ref 34.5–45)
HGB BLD-MCNC: 11.1 G/DL — LOW (ref 11.5–15.5)
HGB BLD-MCNC: 12 G/DL — SIGNIFICANT CHANGE UP (ref 11.5–15.5)
IMM GRANULOCYTES NFR BLD AUTO: 0.7 % — SIGNIFICANT CHANGE UP (ref 0–0.9)
LYMPHOCYTES # BLD AUTO: 1.19 K/UL — SIGNIFICANT CHANGE UP (ref 1–3.3)
LYMPHOCYTES # BLD AUTO: 16.3 % — SIGNIFICANT CHANGE UP (ref 13–44)
MAGNESIUM SERPL-MCNC: 1.7 MG/DL — LOW (ref 1.8–2.6)
MCHC RBC-ENTMCNC: 28.1 PG — SIGNIFICANT CHANGE UP (ref 27–34)
MCHC RBC-ENTMCNC: 28.6 PG — SIGNIFICANT CHANGE UP (ref 27–34)
MCHC RBC-ENTMCNC: 32.3 GM/DL — SIGNIFICANT CHANGE UP (ref 32–36)
MCHC RBC-ENTMCNC: 33.1 GM/DL — SIGNIFICANT CHANGE UP (ref 32–36)
MCV RBC AUTO: 86.6 FL — SIGNIFICANT CHANGE UP (ref 80–100)
MCV RBC AUTO: 87.1 FL — SIGNIFICANT CHANGE UP (ref 80–100)
MONOCYTES # BLD AUTO: 0.48 K/UL — SIGNIFICANT CHANGE UP (ref 0–0.9)
MONOCYTES NFR BLD AUTO: 6.6 % — SIGNIFICANT CHANGE UP (ref 2–14)
NEUTROPHILS # BLD AUTO: 5.27 K/UL — SIGNIFICANT CHANGE UP (ref 1.8–7.4)
NEUTROPHILS NFR BLD AUTO: 72.3 % — SIGNIFICANT CHANGE UP (ref 43–77)
PLATELET # BLD AUTO: 144 K/UL — LOW (ref 150–400)
PLATELET # BLD AUTO: 158 K/UL — SIGNIFICANT CHANGE UP (ref 150–400)
POTASSIUM SERPL-MCNC: 3.2 MMOL/L — LOW (ref 3.5–5.3)
POTASSIUM SERPL-SCNC: 3.2 MMOL/L — LOW (ref 3.5–5.3)
PROT SERPL-MCNC: 6.2 G/DL — LOW (ref 6.6–8.7)
RBC # BLD: 3.95 M/UL — SIGNIFICANT CHANGE UP (ref 3.8–5.2)
RBC # BLD: 4.19 M/UL — SIGNIFICANT CHANGE UP (ref 3.8–5.2)
RBC # FLD: 14.5 % — SIGNIFICANT CHANGE UP (ref 10.3–14.5)
RBC # FLD: 14.6 % — HIGH (ref 10.3–14.5)
SODIUM SERPL-SCNC: 141 MMOL/L — SIGNIFICANT CHANGE UP (ref 135–145)
WBC # BLD: 7.29 K/UL — SIGNIFICANT CHANGE UP (ref 3.8–10.5)
WBC # BLD: 8.28 K/UL — SIGNIFICANT CHANGE UP (ref 3.8–10.5)
WBC # FLD AUTO: 7.29 K/UL — SIGNIFICANT CHANGE UP (ref 3.8–10.5)
WBC # FLD AUTO: 8.28 K/UL — SIGNIFICANT CHANGE UP (ref 3.8–10.5)

## 2023-05-06 PROCEDURE — 99232 SBSQ HOSP IP/OBS MODERATE 35: CPT

## 2023-05-06 PROCEDURE — 99233 SBSQ HOSP IP/OBS HIGH 50: CPT

## 2023-05-06 RX ORDER — POTASSIUM CHLORIDE 20 MEQ
40 PACKET (EA) ORAL ONCE
Refills: 0 | Status: COMPLETED | OUTPATIENT
Start: 2023-05-06 | End: 2023-05-06

## 2023-05-06 RX ORDER — POLYETHYLENE GLYCOL 3350 17 G/17G
17 POWDER, FOR SOLUTION ORAL ONCE
Refills: 0 | Status: COMPLETED | OUTPATIENT
Start: 2023-05-06 | End: 2023-05-06

## 2023-05-06 RX ORDER — HYDRALAZINE HCL 50 MG
10 TABLET ORAL EVERY 8 HOURS
Refills: 0 | Status: DISCONTINUED | OUTPATIENT
Start: 2023-05-06 | End: 2023-05-08

## 2023-05-06 RX ORDER — ALPRAZOLAM 0.25 MG
0.25 TABLET ORAL AT BEDTIME
Refills: 0 | Status: DISCONTINUED | OUTPATIENT
Start: 2023-05-06 | End: 2023-05-08

## 2023-05-06 RX ORDER — MAGNESIUM SULFATE 500 MG/ML
1 VIAL (ML) INJECTION ONCE
Refills: 0 | Status: COMPLETED | OUTPATIENT
Start: 2023-05-06 | End: 2023-05-06

## 2023-05-06 RX ADMIN — SODIUM CHLORIDE 75 MILLILITER(S): 9 INJECTION INTRAMUSCULAR; INTRAVENOUS; SUBCUTANEOUS at 01:10

## 2023-05-06 RX ADMIN — Medication 100 GRAM(S): at 09:17

## 2023-05-06 RX ADMIN — PANTOPRAZOLE SODIUM 40 MILLIGRAM(S): 20 TABLET, DELAYED RELEASE ORAL at 06:45

## 2023-05-06 RX ADMIN — BUDESONIDE AND FORMOTEROL FUMARATE DIHYDRATE 2 PUFF(S): 160; 4.5 AEROSOL RESPIRATORY (INHALATION) at 21:06

## 2023-05-06 RX ADMIN — DONEPEZIL HYDROCHLORIDE 10 MILLIGRAM(S): 10 TABLET, FILM COATED ORAL at 22:37

## 2023-05-06 RX ADMIN — Medication 40 MILLIEQUIVALENT(S): at 09:17

## 2023-05-06 RX ADMIN — TIOTROPIUM BROMIDE 2 PUFF(S): 18 CAPSULE ORAL; RESPIRATORY (INHALATION) at 09:26

## 2023-05-06 RX ADMIN — POLYETHYLENE GLYCOL 3350 17 GRAM(S): 17 POWDER, FOR SOLUTION ORAL at 17:34

## 2023-05-06 RX ADMIN — Medication 0.25 MILLIGRAM(S): at 22:37

## 2023-05-06 RX ADMIN — ATORVASTATIN CALCIUM 40 MILLIGRAM(S): 80 TABLET, FILM COATED ORAL at 22:37

## 2023-05-06 RX ADMIN — LOSARTAN POTASSIUM 100 MILLIGRAM(S): 100 TABLET, FILM COATED ORAL at 06:45

## 2023-05-06 RX ADMIN — PANTOPRAZOLE SODIUM 40 MILLIGRAM(S): 20 TABLET, DELAYED RELEASE ORAL at 17:34

## 2023-05-06 RX ADMIN — BUDESONIDE AND FORMOTEROL FUMARATE DIHYDRATE 2 PUFF(S): 160; 4.5 AEROSOL RESPIRATORY (INHALATION) at 09:25

## 2023-05-06 RX ADMIN — Medication 100 MILLIGRAM(S): at 06:45

## 2023-05-06 RX ADMIN — SERTRALINE 150 MILLIGRAM(S): 25 TABLET, FILM COATED ORAL at 11:39

## 2023-05-07 ENCOUNTER — TRANSCRIPTION ENCOUNTER (OUTPATIENT)
Age: 78
End: 2023-05-07

## 2023-05-07 DIAGNOSIS — Z01.810 ENCOUNTER FOR PREPROCEDURAL CARDIOVASCULAR EXAMINATION: ICD-10-CM

## 2023-05-07 DIAGNOSIS — I48.20 CHRONIC ATRIAL FIBRILLATION, UNSPECIFIED: ICD-10-CM

## 2023-05-07 LAB
ANION GAP SERPL CALC-SCNC: 11 MMOL/L — SIGNIFICANT CHANGE UP (ref 5–17)
BUN SERPL-MCNC: 7.3 MG/DL — LOW (ref 8–20)
CALCIUM SERPL-MCNC: 9 MG/DL — SIGNIFICANT CHANGE UP (ref 8.4–10.5)
CHLORIDE SERPL-SCNC: 104 MMOL/L — SIGNIFICANT CHANGE UP (ref 96–108)
CO2 SERPL-SCNC: 25 MMOL/L — SIGNIFICANT CHANGE UP (ref 22–29)
CREAT SERPL-MCNC: 0.64 MG/DL — SIGNIFICANT CHANGE UP (ref 0.5–1.3)
EGFR: 90 ML/MIN/1.73M2 — SIGNIFICANT CHANGE UP
GLUCOSE SERPL-MCNC: 103 MG/DL — HIGH (ref 70–99)
HCT VFR BLD CALC: 36.3 % — SIGNIFICANT CHANGE UP (ref 34.5–45)
HGB BLD-MCNC: 11.6 G/DL — SIGNIFICANT CHANGE UP (ref 11.5–15.5)
MAGNESIUM SERPL-MCNC: 1.9 MG/DL — SIGNIFICANT CHANGE UP (ref 1.6–2.6)
MCHC RBC-ENTMCNC: 28.1 PG — SIGNIFICANT CHANGE UP (ref 27–34)
MCHC RBC-ENTMCNC: 32 GM/DL — SIGNIFICANT CHANGE UP (ref 32–36)
MCV RBC AUTO: 87.9 FL — SIGNIFICANT CHANGE UP (ref 80–100)
PLATELET # BLD AUTO: 157 K/UL — SIGNIFICANT CHANGE UP (ref 150–400)
POTASSIUM SERPL-MCNC: 3.2 MMOL/L — LOW (ref 3.5–5.3)
POTASSIUM SERPL-SCNC: 3.2 MMOL/L — LOW (ref 3.5–5.3)
RBC # BLD: 4.13 M/UL — SIGNIFICANT CHANGE UP (ref 3.8–5.2)
RBC # FLD: 14.5 % — SIGNIFICANT CHANGE UP (ref 10.3–14.5)
SODIUM SERPL-SCNC: 140 MMOL/L — SIGNIFICANT CHANGE UP (ref 135–145)
WBC # BLD: 6.88 K/UL — SIGNIFICANT CHANGE UP (ref 3.8–10.5)
WBC # FLD AUTO: 6.88 K/UL — SIGNIFICANT CHANGE UP (ref 3.8–10.5)

## 2023-05-07 PROCEDURE — 99232 SBSQ HOSP IP/OBS MODERATE 35: CPT | Mod: FS

## 2023-05-07 PROCEDURE — 99233 SBSQ HOSP IP/OBS HIGH 50: CPT

## 2023-05-07 RX ORDER — SOD SULF/SODIUM/NAHCO3/KCL/PEG
4000 SOLUTION, RECONSTITUTED, ORAL ORAL ONCE
Refills: 0 | Status: COMPLETED | OUTPATIENT
Start: 2023-05-07 | End: 2023-05-07

## 2023-05-07 RX ORDER — MAGNESIUM SULFATE 500 MG/ML
2 VIAL (ML) INJECTION ONCE
Refills: 0 | Status: COMPLETED | OUTPATIENT
Start: 2023-05-07 | End: 2023-05-07

## 2023-05-07 RX ORDER — POTASSIUM CHLORIDE 20 MEQ
20 PACKET (EA) ORAL
Refills: 0 | Status: COMPLETED | OUTPATIENT
Start: 2023-05-07 | End: 2023-05-07

## 2023-05-07 RX ADMIN — PANTOPRAZOLE SODIUM 40 MILLIGRAM(S): 20 TABLET, DELAYED RELEASE ORAL at 05:36

## 2023-05-07 RX ADMIN — Medication 0.25 MILLIGRAM(S): at 22:01

## 2023-05-07 RX ADMIN — Medication 100 MILLIGRAM(S): at 05:35

## 2023-05-07 RX ADMIN — Medication 25 GRAM(S): at 11:00

## 2023-05-07 RX ADMIN — Medication 20 MILLIEQUIVALENT(S): at 11:00

## 2023-05-07 RX ADMIN — Medication 20 MILLIEQUIVALENT(S): at 14:08

## 2023-05-07 RX ADMIN — SERTRALINE 150 MILLIGRAM(S): 25 TABLET, FILM COATED ORAL at 11:00

## 2023-05-07 RX ADMIN — PANTOPRAZOLE SODIUM 40 MILLIGRAM(S): 20 TABLET, DELAYED RELEASE ORAL at 17:34

## 2023-05-07 RX ADMIN — TIOTROPIUM BROMIDE 2 PUFF(S): 18 CAPSULE ORAL; RESPIRATORY (INHALATION) at 08:49

## 2023-05-07 RX ADMIN — LOSARTAN POTASSIUM 100 MILLIGRAM(S): 100 TABLET, FILM COATED ORAL at 05:35

## 2023-05-07 RX ADMIN — DONEPEZIL HYDROCHLORIDE 10 MILLIGRAM(S): 10 TABLET, FILM COATED ORAL at 21:52

## 2023-05-07 RX ADMIN — Medication 4000 MILLILITER(S): at 15:43

## 2023-05-07 RX ADMIN — ATORVASTATIN CALCIUM 40 MILLIGRAM(S): 80 TABLET, FILM COATED ORAL at 21:52

## 2023-05-07 RX ADMIN — BUDESONIDE AND FORMOTEROL FUMARATE DIHYDRATE 2 PUFF(S): 160; 4.5 AEROSOL RESPIRATORY (INHALATION) at 08:49

## 2023-05-07 RX ADMIN — BUDESONIDE AND FORMOTEROL FUMARATE DIHYDRATE 2 PUFF(S): 160; 4.5 AEROSOL RESPIRATORY (INHALATION) at 21:09

## 2023-05-07 NOTE — CONSULT NOTE ADULT - ASSESSMENT
78 yo female with PMH Atrial fib on xarelto, Bovine AVR 2015, COPD, HTN, HLD presenting to the ED with complaint of rectal bleeding x 1 day. She is scheduled for a endoscopy tomorrow and we were asked to optimize her  Currently in atrial fib with slow rates at times down to 45  No significant pauses  Currently on metoprolol 100 qd.  She denies any chest pain palpitations  She walks with a walker but states she could walk 2 blocks without dificulty

## 2023-05-07 NOTE — CONSULT NOTE ADULT - SUBJECTIVE AND OBJECTIVE BOX
[facility]                                              CARDIOLOGY AT [place] [address]                                             Telephone: [phone]. Fax:[phone]                                                         CARDIOLOGY CONSULTATION NOTE                                                                                             Consult requested by:  Dr. Wetzel  History obtained by: Patient and medical record  Community Cardiologist:    obtained: Yes [  ] No [  ]  Reason for Consultation: Bradycardia and risk stratification for endo  Avialable out pt records reviewed: Yes [  ] No [  ]  HPI:  78 yo female with pmhx A. fib on Xarelto, Bovine AVR, COPD, HTN, HLD presenting to the ED with complaint of rectal bleeding x 1 day. She noted this started Thursday 5/4 in the am with bright red blood and clots, and she had 3 separate episodes that day. Daughter states, however, when she saw it that it look more maroon colored to her. Patient has no hx of GI bleed. She does take Xarelto 20 mg daily and has been on Meloxicam 15 mg daily for "a long time" for bursitis. Patient held her Xarelto yesterday, last dose 5/3 evening. She denies abdominal pain, but endorses some dizziness with positional change and mild nausea.  (05 May 2023 05:51)        CARDIAC TESTING   ECHO:      STRESS:    CATH:     ELECTROPHYSIOLOGY:       PAST MEDICAL HISTORY  Atrial fibrillation  COPD (chronic obstructive pulmonary disease)  A-fib      PAST SURGICAL HISTORY  AVR  S/P cholecystectomy  S/P appendectomy          SOCIAL HISTORY:  Denies smoking/alcohol/drugs  CIGARETTES:     ALCOHOL:  DRUGS:      FAMILY HISTORY:  FAMILY HISTORY:  FH: myocardial infarction  father        Family History of Cardiovascular Disease:  Yes [  ] No [  ]  Coronary Artery Disease in first degree relative: Yes [  ] No [  ]  Sudden Cardiac Death in First degree relative: Yes [  ] No [  ]      HOME MEDICATIONS:      CURRENT MEDICATIONS:  hydrALAZINE Injectable 10 milliGRAM(s) IV Push every 8 hours PRN  losartan 100 milliGRAM(s) Oral daily  metoprolol succinate  milliGRAM(s) Oral daily    budesonide  80 MICROgram(s)/formoterol 4.5 MICROgram(s) Inhaler  tiotropium 2.5 MICROgram(s) Inhaler   donepezil  sertraline  pantoprazole  Injectable  polyethylene glycol/electrolyte Solution.  atorvastatin  potassium chloride    Tablet ER  sodium chloride 0.9%.        ALLERGIES: Allergies    No Known Allergies    Intolerances          REVIEW OF SYMPTOMS:   CONSTITUTIONAL: No fever, no chills, no weight loss, no weight gain, no fatigue   ENMT:  No vertigo; No sinus or throat pain  NECK: No pain or stiffness  CARDIOVASCULAR: No chest pain, no dyspnea, no syncope/presyncope, no palpitations, no dizziness, no Orthopnea, no Paroxsymal nocturnal dyspnea  RESPIRATORY: no Shortness of breath, no cough, no wheezing  : No dysuria, no hematuria   GI: No dark color stool, no nausea, no diarrhea, no constipation, no abdominal pain   NEURO: No headache, no slurred speech   MUSCULOSKELETAL: No joint pain or swelling; No muscle, back, or extremity pain  PSYCH: No agitation, no anxiety.    ALL OTHER REVIEW OF SYSTEMS ARE NEGATIVE.      Vital Signs Last 24 Hrs  T(C): 36.6 (07 May 2023 11:29), Max: 36.6 (07 May 2023 11:29)  T(F): 97.9 (07 May 2023 11:29), Max: 97.9 (07 May 2023 11:29)  HR: 61 (07 May 2023 11:29) (56 - 82)  BP: 137/87 (07 May 2023 11:29) (132/78 - 173/75)  BP(mean): 96 (07 May 2023 10:00) (96 - 96)  RR: 18 (07 May 2023 11:29) (18 - 19)  SpO2: 97% (07 May 2023 11:29) (95% - 97%)    Parameters below as of 07 May 2023 11:29  Patient On (Oxygen Delivery Method): room air      INTAKE AND OUTPUT:     PHYSICAL EXAM:  Constitutional: Comfortable . No acute distress.   HEENT: Atraumatic and normocephalic , neck is supple . no JVD. No carotid bruit.  CNS: A&Ox3. No focal deficits.   Respiratory: CTAB, unlabored   Cardiovascular: RRR normal s1 s2. No murmur. No rubs or gallop.  Gastrointestinal: Soft, non-tender. +Bowel sounds.   MSK: full ROM extremities x 4  Extremities: No edema. No cyanosis   Psychiatric: Calm . no agitation.   Skin: Warm and dry, no ulcers on extremities       LABS:                        11.6   6.88  )-----------( 157      ( 07 May 2023 06:08 )             36.3     05-07    140  |  104  |  7.3<L>  ----------------------------<  103<H>  3.2<L>   |  25.0  |  0.64    Ca    9.0      07 May 2023 06:08  Mg     1.9     05-07    TPro  6.2<L>  /  Alb  3.6  /  TBili  0.7  /  DBili  x   /  AST  27  /  ALT  10  /  AlkPhos  72  05-06      ;p-BNP=          INTERPRETATION OF TELEMETRY:     ECG:   Prior ECG: Yes [  ] No [  ]      RADIOLOGY & ADDITIONAL STUDIES:    X-ray:  reviewed by me.   CT scan:   MRI:   US:                                                [facility]                                              CARDIOLOGY AT [place] [address]                                             Telephone: [phone]. Fax:[phone]                                                         CARDIOLOGY CONSULTATION NOTE                                                                                             Consult requested by:  Dr. Wetzel  History obtained by: Patient and medical record  Community Cardiologist:    obtained: Yes [  ] No [  ]  Reason for Consultation: Bradycardia and risk stratification for endo  Available out pt records reviewed: Yes [  ] No [  ]    This is a 76 yo female with PMH Atrial fib on xarelto, Bovine AVR, COPD, HTN, HLD presenting to the ED with complaint of rectal bleeding x 1 day. She is scheduled for a endoscopy tomorrow and we were asked to optimize her  Currently in atrial fib with slow rates at times down to 45  No significant pauses  Currently on metoprolol 100 qd  She denies any chest pain palpitations  She walks with a walker but states she could walk 2 blocks without dificulty      CARDIAC TESTING   Not available    PAST MEDICAL HISTORY  Atrial fibrillation  COPD (chronic obstructive pulmonary disease)  A-fib  HTN    PAST SURGICAL HISTORY  AVR  S/P cholecystectomy  S/P appendectomy    SOCIAL HISTORY:    CIGARETTES:   Former smoker  ALCOHOL: glass of wine at night  DRUGS: Denies    Family History of Cardiovascular Disease:  Yes [x  ] No [  ]  Coronary Artery Disease in first degree relative: Yes [x  ] No [  ]  Sudden Cardiac Death in First degree relative: Yes [  ] No [ x ]      HOME MEDICATIONS:  Home Medications:  Anoro Ellipta 62.5 mcg-25 mcg/inh inhalation powder: 1 puff(s) inhaled once a day (05 May 2023 06:38)  biotin 1000 mcg oral tablet: 1 tab(s) orally once a day (15 Kurt 2020 05:53)  Calcium 600+D oral tablet: 1 tab(s) orally once a day (15 Kurt 2020 05:53)  donepezil 10 mg oral tablet: 1 tab(s) orally once a day (05 May 2023 06:38)  meloxicam 15 mg oral tablet: 1 tab(s) orally once a day (at bedtime) (05 May 2023 06:38)  metoprolol succinate 100 mg oral tablet, extended release: 1 tab(s) orally once a day (05 May 2023 06:38)  olmesartan 40 mg oral tablet: 1 tab(s) orally once a day (15 Kurt 2020 05:53)  pantoprazole 40 mg oral delayed release tablet: 1 tab(s) orally once a day (15 Kurt 2020 05:53)  rosuvastatin 10 mg oral tablet: 1 tab(s) orally once a day (15 Kurt 2020 05:53)  sertraline 150 mg oral capsule: 1 cap(s) orally once a day (05 May 2023 06:38)  Xarelto 20 mg oral tablet: 1 tab(s) orally once a day (05 May 2023 06:38)      CURRENT MEDICATIONS:  hydrALAZINE Injectable 10 milliGRAM(s) IV Push every 8 hours PRN  losartan 100 milliGRAM(s) Oral daily  metoprolol succinate  milliGRAM(s) Oral daily  budesonide  80 MICROgram(s)/formoterol 4.5 MICROgram(s) Inhaler  tiotropium 2.5 MICROgram(s) Inhaler   donepezil  sertraline  pantoprazole  Injectable  polyethylene glycol/electrolyte Solution.  atorvastatin  potassium chloride    Tablet ER  sodium chloride 0.9%.    ALLERGIES: Allergies    REVIEW OF SYMPTOMS:   CONSTITUTIONAL: No fever, no chills, no weight loss, no weight gain, no fatigue   ENMT:  No vertigo; No sinus or throat pain  NECK: No pain or stiffness  CARDIOVASCULAR: No chest pain, no dyspnea, no syncope/presyncope, no palpitations, ++dizziness, no Orthopnea, no Paroxsymal nocturnal dyspnea  RESPIRATORY: no Shortness of breath, no cough, no wheezing  : No dysuria, no hematuria   GI: + dark color stool, no nausea, no diarrhea, no constipation, no abdominal pain   NEURO: No headache, no slurred speech   MUSCULOSKELETAL: No joint pain or swelling; No muscle, back, or extremity pain  PSYCH: No agitation, no anxiety.    ALL OTHER REVIEW OF SYSTEMS ARE NEGATIVE.    Vital Signs Last 24 Hrs  T(C): 36.6 (07 May 2023 11:29), Max: 36.6 (07 May 2023 11:29)  T(F): 97.9 (07 May 2023 11:29), Max: 97.9 (07 May 2023 11:29)  HR: 61 (07 May 2023 11:29) (56 - 82)  BP: 137/87 (07 May 2023 11:29) (132/78 - 173/75)  BP(mean): 96 (07 May 2023 10:00) (96 - 96)  RR: 18 (07 May 2023 11:29) (18 - 19)  SpO2: 97% (07 May 2023 11:29) (95% - 97%)    Parameters below as of 07 May 2023 11:29  Patient On (Oxygen Delivery Method): room air      PHYSICAL EXAM:  Constitutional: Comfortable . No acute distress.   HEENT: Atraumatic and normocephalic , neck is supple . no JVD. No carotid bruit.  CNS: A&Ox3. No focal deficits.   Respiratory: CTAB, unlabored   Cardiovascular: normal s1 s2. irregular 2/6 carmen lsb  Gastrointestinal: Soft, non-tender. +Bowel sounds.   MSK: full ROM extremities x 4  Extremities: No edema. No cyanosis   Psychiatric: Calm . no agitation.   Skin: Warm and dry, no ulcers on extremities       LABS:                        11.6   6.88  )-----------( 157      ( 07 May 2023 06:08 )             36.3     05-07    140  |  104  |  7.3<L>  ----------------------------<  103<H>  3.2<L>   |  25.0  |  0.64    Ca    9.0      07 May 2023 06:08  Mg     1.9     05-07    TPro  6.2<L>  /  Alb  3.6  /  TBili  0.7  /  DBili  x   /  AST  27  /  ALT  10  /  AlkPhos  72  05-06    INTERPRETATION OF TELEMETRY: ATrial fib rates in 60's   ECG: Atrial fibrillation no ischemic changes    RADIOLOGY & ADDITIONAL STUDIES:    X-ray:  reviewed by me. < from: Xray Chest 1 View- PORTABLE-Urgent (05.05.23 @ 02:53) >  IMPRESSION: No acute cardiopulmonary disease process.

## 2023-05-07 NOTE — CONSULT NOTE ADULT - NS ATTEND AMEND GEN_ALL_CORE FT
Patient seen and examined by me personally. Briefly this is a 77y year old Female with relevant history of chronic afib on anticoagulation who presented to the hospital with a rectal bleed, in need of colonoscopy tomorrow. Early this AM she had a period of sinus bradycardia in the 40s. She did not have any pauses on telemetry and felt well, no syncope or dizziness/lightheadedness.     BP stable, denies chest pain or palpitations.       Recommendations:   - Continue metoprolol at current dose. 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 \  - RCRI makes her a low risk patient undergoing a low risk surgery. No further cardiac workup required prior to endoscopy.  - Hold xarelto without bridging; resume when stable from a GI standpoint        Clinton Lara MD  Interventional and Structural Cardiology  [phone]
I evaluated and examined this pt. with my ACP. See above impression and management plan recommendations.

## 2023-05-07 NOTE — CONSULT NOTE ADULT - PROBLEM SELECTOR RECOMMENDATION 2
one rate up 150  At times she had sinus lima in the 40's but comes up quickly  continue metoprolol at 100mg qd  Restart Xarelto as soon as cleared by Gi

## 2023-05-07 NOTE — PROGRESS NOTE ADULT - NS ATTEND AMEND GEN_ALL_CORE FT
Patient seen and examined with NP   No further rectal bleeding. NO BM today . Had asymptomatic bradycardia in to 40 's today . Cardiology ordered echo . If ok, then procede with colonoscopy /EGD   hemoglobin stable  at 11.   Abdominal exam- +BS, non tender   colon prep orders written . Pt is on clear liquid

## 2023-05-07 NOTE — PROGRESS NOTE ADULT - PROBLEM SELECTOR PLAN 1
Hgb stable   colonoscopy / EGD monday  clear liquids  Golytely today at 4 pm   Cardiac clearance tele reports episode bradycardia  Replete potassium / magnesium  Holding AC
Hgb stable    I interpreted the CT images- diverticulosis seen   will plan for colonoscopy monday  clear liquids  Will give one dose of miralax, movi prep tomorrow   orders written, labs reviewed

## 2023-05-07 NOTE — CONSULT NOTE ADULT - PROBLEM SELECTOR RECOMMENDATION 9
will obtain Echo  patient is at 0.4% risk of mi or cardiac arrest introp and up to 30 days post op  If echo ok no further testing is required
Likely diverticular in etiology. Pt with probable old blood in rectal vault on MICHAEL. Pt. refusing to stay in the hospital until Monday for colonoscopy. It was explained to both the pt. and her daughter who was present at the pt's bedside at the time of our evaluation. Bleeding likely resolved but pt. is on chronic Xarelto for AF and should have colonoscopy which was explained to both the pt. and her daughter today. The risks of re-bleeding were also explained to them. They were told that if the pt. insists upon leaving it would have to be AMA. If pt. is to remain in house we will proceed with colonoscopy Monday.

## 2023-05-08 ENCOUNTER — RESULT REVIEW (OUTPATIENT)
Age: 78
End: 2023-05-08

## 2023-05-08 ENCOUNTER — TRANSCRIPTION ENCOUNTER (OUTPATIENT)
Age: 78
End: 2023-05-08

## 2023-05-08 VITALS
SYSTOLIC BLOOD PRESSURE: 104 MMHG | OXYGEN SATURATION: 96 % | DIASTOLIC BLOOD PRESSURE: 67 MMHG | TEMPERATURE: 98 F | RESPIRATION RATE: 18 BRPM | HEART RATE: 81 BPM

## 2023-05-08 LAB
ANION GAP SERPL CALC-SCNC: 11 MMOL/L — SIGNIFICANT CHANGE UP (ref 5–17)
BASOPHILS # BLD AUTO: 0.05 K/UL — SIGNIFICANT CHANGE UP (ref 0–0.2)
BASOPHILS NFR BLD AUTO: 0.7 % — SIGNIFICANT CHANGE UP (ref 0–2)
BUN SERPL-MCNC: 8.7 MG/DL — SIGNIFICANT CHANGE UP (ref 8–20)
CALCIUM SERPL-MCNC: 9.3 MG/DL — SIGNIFICANT CHANGE UP (ref 8.4–10.5)
CHLORIDE SERPL-SCNC: 102 MMOL/L — SIGNIFICANT CHANGE UP (ref 96–108)
CO2 SERPL-SCNC: 25 MMOL/L — SIGNIFICANT CHANGE UP (ref 22–29)
CREAT SERPL-MCNC: 0.78 MG/DL — SIGNIFICANT CHANGE UP (ref 0.5–1.3)
EGFR: 77 ML/MIN/1.73M2 — SIGNIFICANT CHANGE UP
EOSINOPHIL # BLD AUTO: 0.37 K/UL — SIGNIFICANT CHANGE UP (ref 0–0.5)
EOSINOPHIL NFR BLD AUTO: 5 % — SIGNIFICANT CHANGE UP (ref 0–6)
GLUCOSE SERPL-MCNC: 97 MG/DL — SIGNIFICANT CHANGE UP (ref 70–99)
HCT VFR BLD CALC: 35.9 % — SIGNIFICANT CHANGE UP (ref 34.5–45)
HGB BLD-MCNC: 11.6 G/DL — SIGNIFICANT CHANGE UP (ref 11.5–15.5)
IMM GRANULOCYTES NFR BLD AUTO: 0.7 % — SIGNIFICANT CHANGE UP (ref 0–0.9)
LYMPHOCYTES # BLD AUTO: 1.23 K/UL — SIGNIFICANT CHANGE UP (ref 1–3.3)
LYMPHOCYTES # BLD AUTO: 16.5 % — SIGNIFICANT CHANGE UP (ref 13–44)
MAGNESIUM SERPL-MCNC: 2.3 MG/DL — SIGNIFICANT CHANGE UP (ref 1.6–2.6)
MCHC RBC-ENTMCNC: 28.2 PG — SIGNIFICANT CHANGE UP (ref 27–34)
MCHC RBC-ENTMCNC: 32.3 GM/DL — SIGNIFICANT CHANGE UP (ref 32–36)
MCV RBC AUTO: 87.1 FL — SIGNIFICANT CHANGE UP (ref 80–100)
MONOCYTES # BLD AUTO: 0.69 K/UL — SIGNIFICANT CHANGE UP (ref 0–0.9)
MONOCYTES NFR BLD AUTO: 9.3 % — SIGNIFICANT CHANGE UP (ref 2–14)
NEUTROPHILS # BLD AUTO: 5.06 K/UL — SIGNIFICANT CHANGE UP (ref 1.8–7.4)
NEUTROPHILS NFR BLD AUTO: 67.8 % — SIGNIFICANT CHANGE UP (ref 43–77)
PHOSPHATE SERPL-MCNC: 3.7 MG/DL — SIGNIFICANT CHANGE UP (ref 2.4–4.7)
PLATELET # BLD AUTO: 159 K/UL — SIGNIFICANT CHANGE UP (ref 150–400)
POTASSIUM SERPL-MCNC: 3.5 MMOL/L — SIGNIFICANT CHANGE UP (ref 3.5–5.3)
POTASSIUM SERPL-SCNC: 3.5 MMOL/L — SIGNIFICANT CHANGE UP (ref 3.5–5.3)
RBC # BLD: 4.12 M/UL — SIGNIFICANT CHANGE UP (ref 3.8–5.2)
RBC # FLD: 14.6 % — HIGH (ref 10.3–14.5)
SODIUM SERPL-SCNC: 137 MMOL/L — SIGNIFICANT CHANGE UP (ref 135–145)
WBC # BLD: 7.45 K/UL — SIGNIFICANT CHANGE UP (ref 3.8–10.5)
WBC # FLD AUTO: 7.45 K/UL — SIGNIFICANT CHANGE UP (ref 3.8–10.5)

## 2023-05-08 PROCEDURE — 86901 BLOOD TYPING SEROLOGIC RH(D): CPT

## 2023-05-08 PROCEDURE — 86900 BLOOD TYPING SEROLOGIC ABO: CPT

## 2023-05-08 PROCEDURE — 71045 X-RAY EXAM CHEST 1 VIEW: CPT

## 2023-05-08 PROCEDURE — 85027 COMPLETE CBC AUTOMATED: CPT

## 2023-05-08 PROCEDURE — 88342 IMHCHEM/IMCYTCHM 1ST ANTB: CPT | Mod: 26

## 2023-05-08 PROCEDURE — 83735 ASSAY OF MAGNESIUM: CPT

## 2023-05-08 PROCEDURE — 85610 PROTHROMBIN TIME: CPT

## 2023-05-08 PROCEDURE — 43239 EGD BIOPSY SINGLE/MULTIPLE: CPT

## 2023-05-08 PROCEDURE — 84100 ASSAY OF PHOSPHORUS: CPT

## 2023-05-08 PROCEDURE — G1004: CPT

## 2023-05-08 PROCEDURE — 85025 COMPLETE CBC W/AUTO DIFF WBC: CPT

## 2023-05-08 PROCEDURE — 88305 TISSUE EXAM BY PATHOLOGIST: CPT

## 2023-05-08 PROCEDURE — 80053 COMPREHEN METABOLIC PANEL: CPT

## 2023-05-08 PROCEDURE — 99239 HOSP IP/OBS DSCHRG MGMT >30: CPT

## 2023-05-08 PROCEDURE — 93005 ELECTROCARDIOGRAM TRACING: CPT

## 2023-05-08 PROCEDURE — 85730 THROMBOPLASTIN TIME PARTIAL: CPT

## 2023-05-08 PROCEDURE — 45378 DIAGNOSTIC COLONOSCOPY: CPT | Mod: 59

## 2023-05-08 PROCEDURE — 36415 COLL VENOUS BLD VENIPUNCTURE: CPT

## 2023-05-08 PROCEDURE — 84484 ASSAY OF TROPONIN QUANT: CPT

## 2023-05-08 PROCEDURE — 86850 RBC ANTIBODY SCREEN: CPT

## 2023-05-08 PROCEDURE — 94640 AIRWAY INHALATION TREATMENT: CPT

## 2023-05-08 PROCEDURE — 74178 CT ABD&PLV WO CNTR FLWD CNTR: CPT | Mod: MG

## 2023-05-08 PROCEDURE — 88342 IMHCHEM/IMCYTCHM 1ST ANTB: CPT

## 2023-05-08 PROCEDURE — 99285 EMERGENCY DEPT VISIT HI MDM: CPT

## 2023-05-08 PROCEDURE — 88305 TISSUE EXAM BY PATHOLOGIST: CPT | Mod: 26

## 2023-05-08 PROCEDURE — 80048 BASIC METABOLIC PNL TOTAL CA: CPT

## 2023-05-08 DEVICE — NAIL OSTEO 1.5X16MM STRL: Type: IMPLANTABLE DEVICE | Status: FUNCTIONAL

## 2023-05-08 RX ORDER — HYDROCORTISONE 1 %
1 OINTMENT (GRAM) TOPICAL AT BEDTIME
Refills: 0 | Status: DISCONTINUED | OUTPATIENT
Start: 2023-05-08 | End: 2023-05-08

## 2023-05-08 RX ORDER — MELOXICAM 15 MG/1
1 TABLET ORAL
Refills: 0 | DISCHARGE

## 2023-05-08 RX ORDER — HYDROCORTISONE 1 %
1 OINTMENT (GRAM) TOPICAL
Qty: 30 | Refills: 0
Start: 2023-05-08 | End: 2023-06-06

## 2023-05-08 RX ADMIN — Medication 100 MILLIGRAM(S): at 05:09

## 2023-05-08 RX ADMIN — SERTRALINE 150 MILLIGRAM(S): 25 TABLET, FILM COATED ORAL at 11:26

## 2023-05-08 RX ADMIN — LOSARTAN POTASSIUM 100 MILLIGRAM(S): 100 TABLET, FILM COATED ORAL at 05:08

## 2023-05-08 RX ADMIN — PANTOPRAZOLE SODIUM 40 MILLIGRAM(S): 20 TABLET, DELAYED RELEASE ORAL at 05:09

## 2023-05-08 NOTE — PHYSICAL THERAPY INITIAL EVALUATION ADULT - PERTINENT HX OF CURRENT PROBLEM, REHAB EVAL
77 yr old female with atrial fibrillation on Xarelto, hypertension, hyperlipidemia, bovine AVR, COPD presented with complaints of rectal bleeding for 1 day. She was on Meloxicam at home, which was discontinued with Xarelto in the setting of bleeding. GI was consulted. CT abdomen with contrast was done, negative for acute bleed, revealed diverticulosis and thickening of gastric fundus. Hb remained stable.

## 2023-05-08 NOTE — DISCHARGE NOTE NURSING/CASE MANAGEMENT/SOCIAL WORK - PATIENT PORTAL LINK FT
You can access the FollowMyHealth Patient Portal offered by [facility] by registering at the following website: [email or web address]. By joining [employer]’s FollowMyHealth portal, you will also be able to view your health information using other applications (apps) compatible with our system.

## 2023-05-08 NOTE — DISCHARGE NOTE PROVIDER - NSDCFUSCHEDAPPT_GEN_ALL_CORE_FT
Jus Donovan  [facility]  OPHTHALM Christine E Jake S  Scheduled Appointment: 06/14/2023    [facility] [address]  Scheduled Appointment: 06/28/2023    Ori Dahl  [facility] [address]  Scheduled Appointment: 06/28/2023

## 2023-05-08 NOTE — PROGRESS NOTE ADULT - ASSESSMENT
77 yr old female with atrial fibrillation on Xarelto, hypertension, hyperlipidemia, bovine AVR, COPD presented with complaints of rectal bleeding for 1 day. She was on Meloxicam at home, which was discontinued with Xarelto in the setting of bleeding. GI was consulted. CT abdomen with contrast was done, negative for acute bleed, revealed diverticulosis and thickening of gastric fundus. Hb remained stable. GI advised EGD and colonoscopy for further evaluation of bleed. Noted to have bradycardia on telemetry, hence cardiology consultation requested by GI prior to endoscopy.    1. Acute GI bleed:  Hb stable  monitor CBC  EGD/colonoscopy in am  GI follow up noted  hold Xarelto    2. A fib:  On Metoprolol  cardiology eval  continue tele  hold Xarelto    3. Hypertension:  Continue Metoprolol  mg QD and Losartan 100 mg QD  monitor BP    4. Hyperlipidemia:  Continue Lipitor 40 mg QHS    5. COPD:  Chronic and stable  continue Budesonide.    6. Dementia:  Mild  on Aricept    7. DVT ppx:  SCDs    Discussed with patient and RN.  Dispo pending endoscopic work up.
77y/oF PMH afib on xarelto, bovine AVR, COPD, HTN, HLD presenting to ER c/o rectal bleeding x1 day     Rectal bleeding   -CTA without extravasation of contrast   -holding xarelto, meloxicam   -cont to monitor H/H   -GI following   -plan for colonoscopy Monday 5/8  -CLD     chronic afib,   Hx bovine AVR   -cont metoprolol   -xarelto on hold     HTN, HLD   -cont losartan (interchange for olmesartan)   -cont statin     Depression  Mild dementia   -sertraline, donepezil       
77 yr old female with atrial fibrillation on Xarelto, hypertension, hyperlipidemia, bovine AVR, COPD presented with complaints of rectal bleeding for 1 day. She was on Meloxicam at home, which was discontinued with Xarelto in the setting of bleeding. GI was consulted. CT abdomen with contrast was done, negative for acute bleed, revealed diverticulosis and thickening of gastric fundus. Hb remained stable. GI advised EGD and colonoscopy for further evaluation of bleed. Noted to have bradycardia on telemetry, hence cardiology consultation requested by GI prior to endoscopy.    1. Acute GI bleed:  Hb stable  monitor CBC  EGD/colonoscopy done today, noted to have internal hemorrhoids  cleared to resume Xarelto per GI      2. A fib:  On Metoprolol  cardiology eval noted  continue tele  resume Xarelto    3. Hypertension:  Continue Metoprolol  mg QD and Losartan 100 mg QD  monitor BP    4. Hyperlipidemia:  Continue Lipitor 40 mg QHS    5. COPD:  Chronic and stable  continue Budesonide.    6. Dementia:  Mild  on Aricept    7. DVT ppx:  SCDs    Discussed with patient and RN.  updated daughter Ml at bedside. stable for discharge home today, pending PT eval.
78 yo female with pmhx A. fib on Xarelto, Bovine AVR, COPD, HTN, HLD presenting to the ED with complaint of rectal bleeding x 1 day.  NO further BM since  admission. Hgb stable at 11.

## 2023-05-08 NOTE — DISCHARGE NOTE PROVIDER - NSDCCPCAREPLAN_GEN_ALL_CORE_FT
PRINCIPAL DISCHARGE DIAGNOSIS  Diagnosis: Rectal bleeding  Assessment and Plan of Treatment: sec internal hemorroids  continue Anusol  follow up with GI        SECONDARY DISCHARGE DIAGNOSES  Diagnosis: Chronic atrial fibrillation  Assessment and Plan of Treatment: Continue Xarelto and Metoprolol    Diagnosis: Aortic valve replaced  Assessment and Plan of Treatment: Follow up with cardiology

## 2023-05-08 NOTE — DISCHARGE NOTE PROVIDER - CARE PROVIDERS DIRECT ADDRESSES
,DirectAddress_Unknown,[email or web address]

## 2023-05-08 NOTE — DISCHARGE NOTE PROVIDER - HOSPITAL COURSE
77 yr old female with atrial fibrillation on Xarelto, hypertension, hyperlipidemia, bovine AVR, COPD presented with complaints of rectal bleeding for 1 day. She was on Meloxicam at home, which was discontinued with Xarelto in the setting of bleeding. GI was consulted. CT abdomen with contrast was done, negative for acute bleed, revealed diverticulosis and thickening of gastric fundus. Hb remained stable. GI advised EGD and colonoscopy for further evaluation of bleed. Noted to have bradycardia on telemetry, hence cardiology consultation requested by GI prior to endoscopy. She was cleared for the same. EGD wnl, colonoscopy revealed Scattered sigmoid diverticulosis and thrombosed internal hemorrhoids. Anusol was recommended. She was advised to resume Xarelto. PT consulted and she is medically stable for discharge home.

## 2023-05-08 NOTE — CHART NOTE - NSCHARTNOTEFT_GEN_A_CORE
patient being seen for gi bleed in setting of xarelto and meloxicam use    patient seen at beside with daughter and states feeling  a little dizzy     vital appreciated  labs appreciated    pe - as per hpi    a/p  78 yo female with pmhx  A. fib on Xarelto, Bovine AVR, COPD, HTN, HLD presenting to the ED with complaint of rectal bleeding x 1 day. PAtient of note was taking meloxicam as well for joint pain     Acute GI Bleeding, possibly 2/2 Stomach Ulcer  -CTA without any extravasation of contrast/signs of acute bleeding  -HOLD Xarelto and Meloxicam   GI following  - clears   - scope egd and colon monday     A. Fib, HTN  -Continue Metoprolol 100 mg daily  -Hold Xarelto for now pending further GIB workup  -Continue Losartan 100 mg daily in place of Olmesartan 40 mg daily    HLD  -Continue Atorvastatin 40 mg daily as sub for Rosuvastatin 10 mg daily    Depression, Mild Dementia  -Patient AAOx4   -Continue Sertraline 150 mg daily and Donepezil 10 mg daily    family unwilling to stay until monday  dispo unclear
Normal EGD to D2. Colonoscopy; Scattered sigmoid diverticulosis and thrombosed internal hemorrhoids. Pt. likely had hemorrhoidal bleed. Diet and Anusol HC suppositories ordered. OK to restart Xarelto today and send home from a GI standpoint on Anusol HC suppositories PRN.

## 2023-05-08 NOTE — DISCHARGE NOTE PROVIDER - CARE PROVIDER_API CALL
Kemar Cardenas  CARDIOVASCULAR DISEASE  [address] [place] [address]  Phone: [phone]  Fax: [phone]  Follow Up Time:     Matias Sosa)  Gastroenterology; Internal Medicine  [address] [facility], [facility] [address]  [place] [address]  Phone: [phone]  Fax: [phone]  Follow Up Time:

## 2023-05-08 NOTE — DISCHARGE NOTE NURSING/CASE MANAGEMENT/SOCIAL WORK - NSDCPEFALRISK_GEN_ALL_CORE
For information on Fall & Injury Prevention, visit: [email or web address] OR  [email or web address] OR  https://www.cdc.gov/steadi/patient.html

## 2023-05-08 NOTE — PROGRESS NOTE ADULT - SUBJECTIVE AND OBJECTIVE BOX
JENNY AGUIRRE    [id number]    77y      Female    CC: rectal bleeding     INTERVAL HPI/OVERNIGHT EVENTS: pt seen and examined. report of 2 bloody bms today     REVIEW OF SYSTEMS:    CONSTITUTIONAL: No weight loss  RESPIRATORY: No cough, wheezing, hemoptysis; No shortness of breath  CARDIOVASCULAR: No chest pain, palpitations  GASTROINTESTINAL: No abdominal or epigastric pain. No nausea, vomiting    Vital Signs Last 24 Hrs  T(C): 36.6 (06 May 2023 09:57), Max: 36.8 (05 May 2023 19:48)  T(F): 97.9 (06 May 2023 09:57), Max: 98.3 (05 May 2023 23:06)  HR: 82 (06 May 2023 12:48) (68 - 85)  BP: 157/84 (06 May 2023 12:48) (151/82 - 169/73)  BP(mean): 110 (05 May 2023 23:06) (110 - 110)  RR: 17 (06 May 2023 09:57) (17 - 19)  SpO2: 94% (06 May 2023 09:57) (93% - 96%)    Parameters below as of 06 May 2023 09:57  Patient On (Oxygen Delivery Method): room air        PHYSICAL EXAM:    GENERAL: NAD  HEENT: PERRL, +EOMI  NECK: soft, supple  CHEST/LUNG: Clear to auscultation bilaterally  HEART: S1S2+, irregularly irregular   ABDOMEN: Soft, Nontender, Nondistended; Bowel sounds present  SKIN: warm, dry   NEURO: Awake, alert; grossly non-focal   PSYCH: calm, cooperative     LABS:                        12.0   8.28  )-----------( 158      ( 06 May 2023 12:28 )             36.3     05-06    141  |  106  |  11.5  ----------------------------<  88  3.2<L>   |  23.0  |  0.70    Ca    8.8      06 May 2023 05:52  Mg     1.7     05-06    TPro  6.2<L>  /  Alb  3.6  /  TBili  0.7  /  DBili  x   /  AST  27  /  ALT  10  /  AlkPhos  72  05-06    PT/INR - ( 05 May 2023 06:37 )   PT: 14.2 sec;   INR: 1.22 ratio         PTT - ( 05 May 2023 06:37 )  PTT:30.1 sec        MEDICATIONS  (STANDING):  atorvastatin 40 milliGRAM(s) Oral at bedtime  budesonide  80 MICROgram(s)/formoterol 4.5 MICROgram(s) Inhaler 2 Puff(s) Inhalation two times a day  donepezil 10 milliGRAM(s) Oral at bedtime  losartan 100 milliGRAM(s) Oral daily  metoprolol succinate  milliGRAM(s) Oral daily  pantoprazole  Injectable 40 milliGRAM(s) IV Push every 12 hours  polyethylene glycol 3350 17 Gram(s) Oral once  sertraline 150 milliGRAM(s) Oral daily  sodium chloride 0.9%. 1000 milliLiter(s) (75 mL/Hr) IV Continuous <Continuous>  tiotropium 2.5 MICROgram(s) Inhaler 2 Puff(s) Inhalation daily    MEDICATIONS  (PRN):  acetaminophen     Tablet .. 650 milliGRAM(s) Oral every 6 hours PRN Temp greater or equal to 38C (100.4F), Mild Pain (1 - 3)  aluminum hydroxide/magnesium hydroxide/simethicone Suspension 30 milliLiter(s) Oral every 4 hours PRN Dyspepsia  hydrALAZINE Injectable 10 milliGRAM(s) IV Push every 8 hours PRN syt BP >180  LORazepam   Injectable 0.25 milliGRAM(s) IV Push every 8 hours PRN Anxiety  melatonin 3 milliGRAM(s) Oral at bedtime PRN Insomnia  ondansetron Injectable 4 milliGRAM(s) IV Push every 8 hours PRN Nausea and/or Vomiting      RADIOLOGY & ADDITIONAL TESTS:  
INTERVAL HPI/OVERNIGHT EVENTS:    CC: GI bleed, a fib, s/p AVR, hypertension, hyperlipidemia    No overnight events  had endoscopy today  denies complaints.    Vital Signs Last 24 Hrs  T(C): 36.7 (08 May 2023 11:20), Max: 36.8 (08 May 2023 04:18)  T(F): 98.1 (08 May 2023 11:20), Max: 98.2 (08 May 2023 04:18)  HR: 107 (08 May 2023 11:20) (51 - 107)  BP: 132/75 (08 May 2023 11:20) (113/68 - 163/84)  BP(mean): 110 (07 May 2023 19:30) (110 - 110)  RR: 18 (08 May 2023 11:20) (18 - 18)  SpO2: 98% (08 May 2023 11:20) (94% - 98%)    Parameters below as of 08 May 2023 11:20  Patient On (Oxygen Delivery Method): room air        PHYSICAL EXAM:    GENERAL: alert, not in distress  CHEST/LUNG: b/l air entry  HEART: reg  ABDOMEN: soft, bs+  EXTREMITIES:  no edema, tenderness    MEDICATIONS  (STANDING):  atorvastatin 40 milliGRAM(s) Oral at bedtime  budesonide  80 MICROgram(s)/formoterol 4.5 MICROgram(s) Inhaler 2 Puff(s) Inhalation two times a day  donepezil 10 milliGRAM(s) Oral at bedtime  hydrocortisone hemorrhoidal Suppository 1 Suppository(s) Rectal at bedtime  losartan 100 milliGRAM(s) Oral daily  metoprolol succinate  milliGRAM(s) Oral daily  pantoprazole  Injectable 40 milliGRAM(s) IV Push every 12 hours  sertraline 150 milliGRAM(s) Oral daily  sodium chloride 0.9%. 1000 milliLiter(s) (75 mL/Hr) IV Continuous <Continuous>  tiotropium 2.5 MICROgram(s) Inhaler 2 Puff(s) Inhalation daily    MEDICATIONS  (PRN):  acetaminophen     Tablet .. 650 milliGRAM(s) Oral every 6 hours PRN Temp greater or equal to 38C (100.4F), Mild Pain (1 - 3)  ALPRAZolam 0.25 milliGRAM(s) Oral at bedtime PRN insomnia and/or anxiety  aluminum hydroxide/magnesium hydroxide/simethicone Suspension 30 milliLiter(s) Oral every 4 hours PRN Dyspepsia  hydrALAZINE Injectable 10 milliGRAM(s) IV Push every 8 hours PRN syt BP >180  melatonin 3 milliGRAM(s) Oral at bedtime PRN Insomnia  ondansetron Injectable 4 milliGRAM(s) IV Push every 8 hours PRN Nausea and/or Vomiting      Allergies    No Known Allergies    Intolerances          LABS:                          11.6   7.45  )-----------( 159      ( 08 May 2023 05:46 )             35.9     05-08    137  |  102  |  8.7  ----------------------------<  97  3.5   |  25.0  |  0.78    Ca    9.3      08 May 2023 05:46  Phos  3.7     05-08  Mg     2.3     05-08            RADIOLOGY & ADDITIONAL TESTS:  
INTERVAL HPI/OVERNIGHT EVENTS:    CC: GI bleed, a fib, s/p AVR, hypertension, hyperlipidemia    Chart and course reviewed.  No overnight events  denies BM today  no abdominal pain  on clears    Vital Signs Last 24 Hrs  T(C): 36.6 (07 May 2023 11:29), Max: 36.6 (07 May 2023 11:29)  T(F): 97.9 (07 May 2023 11:29), Max: 97.9 (07 May 2023 11:29)  HR: 61 (07 May 2023 11:29) (56 - 78)  BP: 137/87 (07 May 2023 11:29) (132/78 - 173/75)  BP(mean): 96 (07 May 2023 10:00) (96 - 96)  RR: 18 (07 May 2023 11:29) (18 - 19)  SpO2: 97% (07 May 2023 11:29) (95% - 97%)    Parameters below as of 07 May 2023 11:29  Patient On (Oxygen Delivery Method): room air        PHYSICAL EXAM:    GENERAL: alert, not in distress  CHEST/LUNG: b/l air entry  HEART: reg  ABDOMEN: soft, bs+, non tender  EXTREMITIES:  no edema, tenderness    MEDICATIONS  (STANDING):  atorvastatin 40 milliGRAM(s) Oral at bedtime  budesonide  80 MICROgram(s)/formoterol 4.5 MICROgram(s) Inhaler 2 Puff(s) Inhalation two times a day  donepezil 10 milliGRAM(s) Oral at bedtime  losartan 100 milliGRAM(s) Oral daily  metoprolol succinate  milliGRAM(s) Oral daily  pantoprazole  Injectable 40 milliGRAM(s) IV Push every 12 hours  polyethylene glycol/electrolyte Solution. 4000 milliLiter(s) Oral once  potassium chloride    Tablet ER 20 milliEquivalent(s) Oral every 2 hours  sertraline 150 milliGRAM(s) Oral daily  sodium chloride 0.9%. 1000 milliLiter(s) (75 mL/Hr) IV Continuous <Continuous>  tiotropium 2.5 MICROgram(s) Inhaler 2 Puff(s) Inhalation daily    MEDICATIONS  (PRN):  acetaminophen     Tablet .. 650 milliGRAM(s) Oral every 6 hours PRN Temp greater or equal to 38C (100.4F), Mild Pain (1 - 3)  ALPRAZolam 0.25 milliGRAM(s) Oral at bedtime PRN insomnia and/or anxiety  aluminum hydroxide/magnesium hydroxide/simethicone Suspension 30 milliLiter(s) Oral every 4 hours PRN Dyspepsia  hydrALAZINE Injectable 10 milliGRAM(s) IV Push every 8 hours PRN syt BP >180  melatonin 3 milliGRAM(s) Oral at bedtime PRN Insomnia  ondansetron Injectable 4 milliGRAM(s) IV Push every 8 hours PRN Nausea and/or Vomiting      Allergies    No Known Allergies    Intolerances          LABS:                          11.6   6.88  )-----------( 157      ( 07 May 2023 06:08 )             36.3     05-07    140  |  104  |  7.3<L>  ----------------------------<  103<H>  3.2<L>   |  25.0  |  0.64    Ca    9.0      07 May 2023 06:08  Mg     1.9     05-07    TPro  6.2<L>  /  Alb  3.6  /  TBili  0.7  /  DBili  x   /  AST  27  /  ALT  10  /  AlkPhos  72  05-06          RADIOLOGY & ADDITIONAL TESTS:  
Patient is a 77y old  Female who presents with a chief complaint of GI Bleed (05 May 2023 08:53)      HPI:  76 yo female with pmhx A. fib on Xarelto, Bovine AVR, COPD, HTN, HLD presenting to the ED with complaint of rectal bleeding x 1 day.  NO further BM since  admission. Hgb stable at 11.       REVIEW OF SYSTEMS:  Constitutional: No fever, weight loss or fatigue  ENMT:  No difficulty hearing, tinnitus, vertigo; No sinus or throat pain  Respiratory: No cough, wheezing, chills or hemoptysis  Cardiovascular: No chest pain, palpitations, dizziness or leg swelling  Gastrointestinal: No abdominal or epigastric pain. No nausea, vomiting or hematemesis; No diarrhea or constipation. No melena or hematochezia.  Skin: No itching, burning, rashes or lesions   Musculoskeletal: No joint pain or swelling; No muscle, back or extremity pain    PAST MEDICAL & SURGICAL HISTORY:  Atrial fibrillation      COPD (chronic obstructive pulmonary disease)      Aortic valve replaced      COPD (chronic obstructive pulmonary disease)      S/P cholecystectomy      S/P appendectomy          FAMILY HISTORY:  FH: myocardial infarction  father        SOCIAL HISTORY:  Smoking Status: [ ] Current, [ ] Former, [ ] Never  Pack Years:  [  ] EtOH-no  [  ] IVDA    MEDICATIONS:  MEDICATIONS  (STANDING):  atorvastatin 40 milliGRAM(s) Oral at bedtime  budesonide  80 MICROgram(s)/formoterol 4.5 MICROgram(s) Inhaler 2 Puff(s) Inhalation two times a day  donepezil 10 milliGRAM(s) Oral at bedtime  losartan 100 milliGRAM(s) Oral daily  metoprolol succinate  milliGRAM(s) Oral daily  pantoprazole  Injectable 40 milliGRAM(s) IV Push every 12 hours  sertraline 150 milliGRAM(s) Oral daily  sodium chloride 0.9%. 1000 milliLiter(s) (75 mL/Hr) IV Continuous <Continuous>  tiotropium 2.5 MICROgram(s) Inhaler 2 Puff(s) Inhalation daily    MEDICATIONS  (PRN):  acetaminophen     Tablet .. 650 milliGRAM(s) Oral every 6 hours PRN Temp greater or equal to 38C (100.4F), Mild Pain (1 - 3)  aluminum hydroxide/magnesium hydroxide/simethicone Suspension 30 milliLiter(s) Oral every 4 hours PRN Dyspepsia  hydrALAZINE Injectable 10 milliGRAM(s) IV Push every 8 hours PRN syt BP >180  LORazepam   Injectable 0.25 milliGRAM(s) IV Push every 8 hours PRN Anxiety  melatonin 3 milliGRAM(s) Oral at bedtime PRN Insomnia  ondansetron Injectable 4 milliGRAM(s) IV Push every 8 hours PRN Nausea and/or Vomiting      Allergies    No Known Allergies    Intolerances        Vital Signs Last 24 Hrs  T(C): 36.6 (06 May 2023 09:57), Max: 36.8 (05 May 2023 19:48)  T(F): 97.9 (06 May 2023 09:57), Max: 98.3 (05 May 2023 23:06)  HR: 75 (06 May 2023 09:57) (68 - 85)  BP: 169/73 (06 May 2023 09:57) (151/82 - 169/73)  BP(mean): 110 (05 May 2023 23:06) (110 - 110)  RR: 17 (06 May 2023 09:57) (17 - 19)  SpO2: 94% (06 May 2023 09:57) (93% - 96%)    Parameters below as of 06 May 2023 09:57  Patient On (Oxygen Delivery Method): room air            PHYSICAL EXAM:    General: ; in no acute distress  HEENT: MMM, conjunctiva and sclera clear  H-RRR  L-CTA  Gastrointestinal: Soft, non-tender non-distended; Normal bowel sounds; No rebound or guarding  Extremities: Normal range of motion, No clubbing, cyanosis or edema  Neurological: Alert and oriented x3  Skin: Warm and dry. No obvious rash      LABS:                        11.1   7.29  )-----------( 144      ( 06 May 2023 05:52 )             34.4     06 May 2023 05:52    141    |  106    |  11.5   ----------------------------<  88     3.2     |  23.0   |  0.70     Ca    8.8        06 May 2023 05:52  Mg     1.7       06 May 2023 05:52    TPro  6.2    /  Alb  3.6    /  TBili  0.7    /  DBili  x      /  AST  27     /  ALT  10     /  AlkPhos  72     / Amylase x      /Lipase x      06 May 2023 05:52              RADIOLOGY & ADDITIONAL STUDIES:     < from: CT Abdomen and Pelvis w/wo IV Cont (05.05.23 @ 04:15) >  No evidence of active gastrointestinal bleeding. Colonic diverticulosis   without evidence of diverticulitis.    Thickening of the walls of the gastric fundus and body which are similar   in appearance to the prior exam from 12/29/2019. Nonemergent endoscopy   should be considered to further evaluate.        --- End of Report ---          < end of copied text >  
      HPI:  78 yo female with pmhx A. fib on Xarelto, Bovine AVR, COPD, HTN, HLD presenting to the ED with complaint of rectal bleeding x 1 day. She noted this started Thursday 5/4 in the am with bright red blood and clots, and she had 3 separate episodes that day. Daughter states, however, when she saw it that it look more maroon colored to her. Patient has no hx of GI bleed. She does take Xarelto 20 mg daily and has been on Meloxicam 15 mg daily for "a long time" for bursitis. Patient held her Xarelto yesterday, last dose 5/3 evening. She denies abdominal pain, but endorses some dizziness with positional change and mild nausea.  (05 May 2023 05:51)  GI following melena      Events 24 hr: Examined/interviewed sitting up in bed Denies abd com[plaints, no stool or emesis prev 24 hrs Tolerating liquid diet    REVIEW OF SYSTEMS:  Constitutional: No fever, weight loss or fatigue  ENMT:  No difficulty hearing, tinnitus, vertigo; No sinus or throat pain  Respiratory: No cough, wheezing, chills or hemoptysis  Cardiovascular: No chest pain, palpitations, dizziness or leg swelling  Gastrointestinal: No abdominal or epigastric pain. No nausea, vomiting or hematemesis; No diarrhea or constipation. No melena or hematochezia.  Skin: No itching, burning, rashes or lesions   Musculoskeletal: No joint pain or swelling; No muscle, back or extremity pain    PAST MEDICAL & SURGICAL HISTORY:  Atrial fibrillation      COPD (chronic obstructive pulmonary disease)      Aortic valve replaced      COPD (chronic obstructive pulmonary disease)      S/P cholecystectomy      S/P appendectomy          FAMILY HISTORY:  FH: myocardial infarction  father          MEDICATIONS:  MEDICATIONS  (STANDING):  atorvastatin 40 milliGRAM(s) Oral at bedtime  budesonide  80 MICROgram(s)/formoterol 4.5 MICROgram(s) Inhaler 2 Puff(s) Inhalation two times a day  donepezil 10 milliGRAM(s) Oral at bedtime  losartan 100 milliGRAM(s) Oral daily  magnesium sulfate  IVPB 2 Gram(s) IV Intermittent once  metoprolol succinate  milliGRAM(s) Oral daily  pantoprazole  Injectable 40 milliGRAM(s) IV Push every 12 hours  polyethylene glycol/electrolyte Solution. 4000 milliLiter(s) Oral once  potassium chloride    Tablet ER 20 milliEquivalent(s) Oral every 2 hours  sertraline 150 milliGRAM(s) Oral daily  sodium chloride 0.9%. 1000 milliLiter(s) (75 mL/Hr) IV Continuous <Continuous>  tiotropium 2.5 MICROgram(s) Inhaler 2 Puff(s) Inhalation daily    MEDICATIONS  (PRN):  acetaminophen     Tablet .. 650 milliGRAM(s) Oral every 6 hours PRN Temp greater or equal to 38C (100.4F), Mild Pain (1 - 3)  ALPRAZolam 0.25 milliGRAM(s) Oral at bedtime PRN insomnia and/or anxiety  aluminum hydroxide/magnesium hydroxide/simethicone Suspension 30 milliLiter(s) Oral every 4 hours PRN Dyspepsia  hydrALAZINE Injectable 10 milliGRAM(s) IV Push every 8 hours PRN syt BP >180  melatonin 3 milliGRAM(s) Oral at bedtime PRN Insomnia  ondansetron Injectable 4 milliGRAM(s) IV Push every 8 hours PRN Nausea and/or Vomiting      Allergies    No Known Allergies    Intolerances        Vital Signs Last 24 Hrs  T(C): 36.4 (07 May 2023 10:00), Max: 36.5 (06 May 2023 17:15)  T(F): 97.5 (07 May 2023 10:00), Max: 97.7 (06 May 2023 17:15)  HR: 57 (07 May 2023 10:00) (56 - 82)  BP: 132/78 (07 May 2023 10:00) (132/78 - 173/75)  BP(mean): 96 (07 May 2023 10:00) (96 - 96)  RR: 18 (07 May 2023 10:00) (18 - 19)  SpO2: 97% (07 May 2023 10:00) (95% - 97%)    Parameters below as of 07 May 2023 10:00  Patient On (Oxygen Delivery Method): room air            PHYSICAL EXAM:    General:  in no acute distress  HEENT: MMM, conjunctiva and sclera clear  Gastrointestinal: Soft, non-tender non-distended; Normal bowel sounds; No rebound or guarding  Extremities: Normal range of motion, No clubbing, cyanosis or edema  Neurological: Alert and oriented x3  Skin: Warm and dry. No obvious rash      LABS:                        11.6   6.88  )-----------( 157      ( 07 May 2023 06:08 )             36.3     07 May 2023 06:08    140    |  104    |  7.3    ----------------------------<  103    3.2     |  25.0   |  0.64     Ca    9.0        07 May 2023 06:08  Mg     1.9       07 May 2023 06:08    TPro  6.2    /  Alb  3.6    /  TBili  0.7    /  DBili  x      /  AST  27     /  ALT  10     /  AlkPhos  72     / Amylase x      /Lipase x      06 May 2023 05:52              RADIOLOGY & ADDITIONAL STUDIES:

## 2023-05-08 NOTE — PHYSICAL THERAPY INITIAL EVALUATION ADULT - ADDITIONAL COMMENTS
Pt reports living with daughter in a house on the ground level with 0 JEFRY. Pt resides on the ground level with 0 steps. Pt amb with rollator and is independent with functional mobility, ADLs, and IADLs. Pt does not drive and is retired. Pt has support of family. Pt owns RW, rollator and SAC.

## 2023-05-08 NOTE — DISCHARGE NOTE PROVIDER - NSDCFUADDINST_GEN_ALL_CORE_FT
Colonoscopy; Scattered sigmoid diverticulosis and thrombosed internal hemorrhoids. Pt. likely had hemorrhoidal bleed

## 2023-05-11 LAB — SURGICAL PATHOLOGY STUDY: SIGNIFICANT CHANGE UP

## 2023-06-28 ENCOUNTER — APPOINTMENT (OUTPATIENT)
Dept: PULMONOLOGY | Facility: CLINIC | Age: 78
End: 2023-06-28
Payer: MEDICARE

## 2023-06-28 VITALS — BODY MASS INDEX: 26.87 KG/M2 | HEIGHT: 58 IN | WEIGHT: 128 LBS

## 2023-06-28 VITALS — RESPIRATION RATE: 16 BRPM | SYSTOLIC BLOOD PRESSURE: 130 MMHG | DIASTOLIC BLOOD PRESSURE: 80 MMHG

## 2023-06-28 VITALS — OXYGEN SATURATION: 95 % | HEART RATE: 80 BPM

## 2023-06-28 DIAGNOSIS — Z87.891 PERSONAL HISTORY OF NICOTINE DEPENDENCE: ICD-10-CM

## 2023-06-28 PROCEDURE — 99214 OFFICE O/P EST MOD 30 MIN: CPT | Mod: 25

## 2023-06-28 PROCEDURE — 94010 BREATHING CAPACITY TEST: CPT

## 2023-06-28 RX ORDER — CHROMIUM 200 MCG
TABLET ORAL
Refills: 0 | Status: ACTIVE | COMMUNITY

## 2023-06-28 NOTE — CONSULT LETTER
[Dear  ___] : Dear  [unfilled], [Consult Letter:] : I had the pleasure of evaluating your patient, [unfilled]. [Please see my note below.] : Please see my note below. [Sincerely,] : Sincerely, [DrDaphne  ___] : Dr. BENNETT [DrDaphne ___] : Dr. BENNETT [FreeTextEntry3] : [facility]par Pul[facility] Critical Care\par Director Pulmonary Division\par Medical Director Respiratory Therapy\par [facility]\par \par

## 2023-06-28 NOTE — HISTORY OF PRESENT ILLNESS
[Former] : former [TextBox_4] : 30 pack yr smoker, quit 30 yrs ago\par using anoro daily, off Pulmicort\par Remains  on Xarelto, PAF\par no chest pain or sputum\par no fever, chill, chest pain or sputum\par Cardiology Dr Cardenas, echo planned 6 months\par \par 6/28/23\par off Anoro x 1 month, $$\par called insurance who told her MD has to call\par noticed slight incr in DOMINGUEZ off inhaler\par no fever, chill, chest pain\par Remains on Xarelto for PAF [YearQuit] : 1990

## 2023-06-28 NOTE — DISCUSSION/SUMMARY
[FreeTextEntry1] :  COPD with asthmatic component, currently slightly worse  than baseline\par currently lung exam without bronchospasm , normal sp02\par Spirometry with decreased flows off  Anoro, restart and samples given\par Await insurance input for altrnative\par underlying PAF on full AC and spinal stenosis by hx\par CXR 8/22 no acute pathology, quit smoking 1990s, will repeat CXR\par Cardiology following , no recent falls, using rolling walker\par 5  months or sooner if needed with spirometry\par

## 2023-07-11 ENCOUNTER — NON-APPOINTMENT (OUTPATIENT)
Age: 78
End: 2023-07-11

## 2023-07-11 ENCOUNTER — APPOINTMENT (OUTPATIENT)
Dept: OPHTHALMOLOGY | Facility: CLINIC | Age: 78
End: 2023-07-11
Payer: MEDICARE

## 2023-07-11 ENCOUNTER — APPOINTMENT (OUTPATIENT)
Dept: OPHTHALMOLOGY | Facility: CLINIC | Age: 78
End: 2023-07-11
Payer: SELF-PAY

## 2023-07-11 PROCEDURE — 92015 DETERMINE REFRACTIVE STATE: CPT

## 2023-07-11 PROCEDURE — 92134 CPTRZ OPH DX IMG PST SGM RTA: CPT

## 2023-07-11 PROCEDURE — 92014 COMPRE OPH EXAM EST PT 1/>: CPT

## 2023-07-19 ENCOUNTER — NON-APPOINTMENT (OUTPATIENT)
Age: 78
End: 2023-07-19

## 2023-08-06 ENCOUNTER — NON-APPOINTMENT (OUTPATIENT)
Age: 78
End: 2023-08-06

## 2023-10-18 ENCOUNTER — OFFICE (OUTPATIENT)
Dept: URBAN - METROPOLITAN AREA CLINIC 104 | Facility: CLINIC | Age: 78
Setting detail: OPHTHALMOLOGY
End: 2023-10-18
Payer: MEDICARE

## 2023-10-18 DIAGNOSIS — H52.03: ICD-10-CM

## 2023-10-18 DIAGNOSIS — H43.813: ICD-10-CM

## 2023-10-18 DIAGNOSIS — H35.3132: ICD-10-CM

## 2023-10-18 DIAGNOSIS — H35.373: ICD-10-CM

## 2023-10-18 DIAGNOSIS — H35.40: ICD-10-CM

## 2023-10-18 DIAGNOSIS — Z96.1: ICD-10-CM

## 2023-10-18 PROCEDURE — 92004 COMPRE OPH EXAM NEW PT 1/>: CPT | Performed by: OPTOMETRIST

## 2023-10-18 PROCEDURE — 92015 DETERMINE REFRACTIVE STATE: CPT | Performed by: OPTOMETRIST

## 2023-10-18 PROCEDURE — 92134 CPTRZ OPH DX IMG PST SGM RTA: CPT | Performed by: OPTOMETRIST

## 2023-10-18 ASSESSMENT — TONOMETRY
OD_IOP_MMHG: 14
OS_IOP_MMHG: 12

## 2023-10-18 ASSESSMENT — CONFRONTATIONAL VISUAL FIELD TEST (CVF)
OD_FINDINGS: FULL
OS_FINDINGS: FULL

## 2023-10-19 PROBLEM — H35.40 PERIPAPILLARY ATROPHY ; BOTH EYES: Status: ACTIVE | Noted: 2023-10-18

## 2023-10-19 PROBLEM — H35.3132 AGE RELATED MACULAR DEGENERATION DRY; BOTH EYES INTERMEDIATE: Status: ACTIVE | Noted: 2023-10-18

## 2023-10-19 ASSESSMENT — REFRACTION_MANIFEST
OS_VA1: 20/40+
OD_CYLINDER: -1.00
OS_SPHERE: -0.25
OD_AXIS: 026
OD_VA1: 20/40
OD_CYLINDER: -1.50
OS_SPHERE: +2.50
OS_CYLINDER: -0.50
OD_SPHERE: PL
OS_CYLINDER: -0.50
OD_AXIS: 028
OS_AXIS: 163
OS_AXIS: 165
OD_SPHERE: +1.75

## 2023-10-19 ASSESSMENT — REFRACTION_CURRENTRX
OS_CYLINDER: -0.75
OD_CYLINDER: -1.00
OS_AXIS: 160
OS_OVR_VA: 20/
OS_AXIS: 144
OD_SPHERE: +2.25
OD_OVR_VA: 20/
OS_SPHERE: +2.50
OD_CYLINDER: -1.00
OD_AXIS: 028
OD_SPHERE: +1.75
OD_OVR_VA: 20/
OS_SPHERE: +2.50
OS_CYLINDER: -0.50
OS_OVR_VA: 20/
OD_AXIS: 029

## 2023-10-19 ASSESSMENT — SPHEQUIV_DERIVED
OS_SPHEQUIV: 2.25
OS_SPHEQUIV: -0.5
OD_SPHEQUIV: 1.25

## 2023-10-19 ASSESSMENT — VISUAL ACUITY
OS_BCVA: 20/80+2
OD_BCVA: 20/30

## 2023-10-27 ENCOUNTER — NON-APPOINTMENT (OUTPATIENT)
Age: 78
End: 2023-10-27

## 2024-01-10 ENCOUNTER — APPOINTMENT (OUTPATIENT)
Dept: OPHTHALMOLOGY | Facility: CLINIC | Age: 79
End: 2024-01-10

## 2024-01-11 ENCOUNTER — APPOINTMENT (OUTPATIENT)
Dept: PULMONOLOGY | Facility: CLINIC | Age: 79
End: 2024-01-11
Payer: MEDICARE

## 2024-01-11 VITALS
RESPIRATION RATE: 16 BRPM | HEIGHT: 58 IN | WEIGHT: 131 LBS | BODY MASS INDEX: 27.5 KG/M2 | DIASTOLIC BLOOD PRESSURE: 80 MMHG | SYSTOLIC BLOOD PRESSURE: 140 MMHG

## 2024-01-11 VITALS — HEART RATE: 84 BPM | OXYGEN SATURATION: 95 %

## 2024-01-11 PROCEDURE — 99213 OFFICE O/P EST LOW 20 MIN: CPT

## 2024-01-11 NOTE — CONSULT LETTER
[Dear  ___] : Dear  [unfilled], [Consult Letter:] : I had the pleasure of evaluating your patient, [unfilled]. [Please see my note below.] : Please see my note below. [Sincerely,] : Sincerely, [DrDaphne  ___] : Dr. BENNETT [DrDaphne ___] : Dr. BENNETT [FreeTextEntry3] : [facility]\par  Pulmonary Critical Care\par  Director Pulmonary Division\par  Medical Director Respiratory Therapy\par  [facility]\par  \par

## 2024-01-11 NOTE — HISTORY OF PRESENT ILLNESS
[Former] : former [TextBox_4] : 30 pack yr smoker, quit 30 yrs ago using anoro daily, off Pulmicort Remains  on Xarelto, PAF no chest pain or sputum no fever, chill, chest pain or sputum Cardiology Dr Cardenas, echo planned 6 months  1/10/24 Anoro daily xarelto no bleeding no fever, chill, chest pain has dyspepsia. more sedentary gets light headed with walking, saw Nuro in past

## 2024-01-11 NOTE — PROCEDURE
[FreeTextEntry1] : CT scan 1/20:  mild emphysema, granulomas, prom pulmonary artery   CXR 10/23  no active disease

## 2024-02-01 NOTE — DISCHARGE NOTE PROVIDER - NSFTFHOMEHTHYNRD_GEN_ALL_CORE
Date of Office Visit: 24    Encounter Provider: Evelio Mcneal MD  Place of Service: [facility] CARDIOLOGY  Patient Name: Javier Grant  :1942    Chief Complaint   Patient presents with    Coronary Artery Disease    Hospital Follow Up Visit     HPI: Javier Grant is a 81 y.o. male with a medical history of coronary artery disease, aortic valve stenosis, hypertension, sick sinus syndrome with presence of pacemaker, PVCs and right bundle branch block.  In , he underwent CABG x3 (SVG to LAD, SVG to OM1 and SVG to OM 2.  His anginal equivalent is always been chest pain, mainly in the center of his chest.  In , he underwent left heart cath which showed occlusion of all native vessels proximally, although there was 70% stenosis in mid SVG to OM 2.  He underwent placement of 4.0 x 15 mm Resolute JORGE to this lesion, as well as, placement of 3.0 x 12 mm Xience Alpine JORGE to anastomotic site of one of the grafts to the OM branches.  The LAD and saphenous vein graft to LAD collateralized all 3 major branches distally (circumflex, RCA and ramus).  Patient has history of fairly severe nosebleeds on Plavix.    He wore a 24-hour Holter monitor in  secondary to episodes of near syncope.  He was found to have significant intermittent bradycardia along with significant pauses.  He underwent placement of [employer] dual-chamber permanent pacemaker on 3/4/2022 by Dr. Francois.Patient had chest discomfort in 2022.  He underwent stress test which showed no ischemia.      The patient had a non-ST elevation MI in the Ye system and underwent coronary angiography along with echo there.  He he was noted to have a 95% stenosis of the SVG to OM1 with unsuccessful angioplasty and distal vessel closure.  His ejection fraction was noted to be about 45% at that time.  He was placed on Imdur and has been on Lasix which was started recently.  He states that his neck  discomfort has not recurred and that his lower extremity edema has improved.    Previous testing and notes have been reviewed by  me.     Past Medical History:   Diagnosis Date    Abnormal ECG 1980    Arthritis     Bleeds easily     WAS TOLD THAT AFTER CABG    CAD (coronary artery disease)     Clotting disorder 1990    Noted bu surgeon when I had the heart bypasses    Colon polyp     Disorder of aorta     Dizziness     SKELTON (dyspnea on exertion)     Enlarged prostate     Gastritis     Gout     Heart murmur ?    Hiatal hernia     History of MI (myocardial infarction)     1990    HL (hearing loss)     Hyperlipidemia     Hypertension     Macular degeneration     rt eye    Mitral valve prolapse 1990    Myocardial infarction 1990    Nonrheumatic aortic (valve) insufficiency     Nonrheumatic aortic (valve) stenosis     RBBB (right bundle branch block)     Sleep apnea     USES CPAP    Umbilical hernia     Vitamin D deficiency        Past Surgical History:   Procedure Laterality Date    CARDIAC CATHETERIZATION Left 10/09/2015    Dr. Robin Kingston    CARDIAC ELECTROPHYSIOLOGY PROCEDURE N/A 03/04/2022    Procedure: Pacemaker DC new  [place];  Surgeon: Serafin Francois MD;  Location: [facility] CATH INVASIVE LOCATION;  Service: Cardiology;  Laterality: N/A;    CARDIAC SURGERY  1990    triple bypass    CATARACT EXTRACTION Bilateral     CORONARY ANGIOPLASTY WITH STENT PLACEMENT  2015    CORONARY ARTERY BYPASS GRAFT  1990    3 VESSELS    ENDOSCOPY N/A 05/26/2017    Procedure: ESOPHAGOGASTRODUODENOSCOPY WITH COLD BIOIPSIES AND BALLOON DILITATION SIZE 15, 16.5, 18;  Surgeon: Jerry SNOWDEN MD;  Location: [facility] ENDOSCOPY;  Service:     INSERT / REPLACE / REMOVE PACEMAKER  03/05/2022    MCCOY'S NEUROMA EXCISION Right     TONSILLECTOMY      UMBILICAL HERNIA REPAIR N/A 10/09/2017    Procedure: UMBILICAL HERNIA REPAIR;  Surgeon: Blake Kelly Jr., MD;  Location: [facility] OR;  Service:        Social History     Socioeconomic  History    Marital status:    Tobacco Use    Smoking status: Former     Packs/day: 0.00     Years: 15.00     Additional pack years: 0.00     Total pack years: 0.00     Types: Cigarettes     Start date: 1957     Quit date: 1980     Years since quittin.1    Smokeless tobacco: Never   Vaping Use    Vaping Use: Never used   Substance and Sexual Activity    Alcohol use: No    Drug use: Never    Sexual activity: Not Currently     Partners: Female     Birth control/protection: Post-menopausal, None       Family History   Problem Relation Age of Onset    Depression Mother     Colon polyps Mother     Heart attack Father     Aneurysm Father     Stomach cancer Brother     Malig Hyperthermia Neg Hx        Review of Systems   Constitutional: Negative. Negative for fever and malaise/fatigue.   HENT: Negative.  Negative for nosebleeds and sore throat.    Eyes: Negative.  Negative for blurred vision and double vision.   Cardiovascular: Negative.  Negative for chest pain, claudication, palpitations and syncope.   Respiratory: Negative.  Negative for cough, shortness of breath and snoring.    Endocrine: Negative.  Negative for cold intolerance, heat intolerance and polydipsia.   Hematologic/Lymphatic: Negative.    Skin: Negative.  Negative for itching, poor wound healing and rash.   Musculoskeletal: Negative.  Negative for joint pain, joint swelling, muscle weakness and myalgias.   Gastrointestinal: Negative.  Negative for abdominal pain, melena, nausea and vomiting.   Genitourinary: Negative.    Neurological: Negative.  Negative for light-headedness, loss of balance, seizures, vertigo and weakness.   Psychiatric/Behavioral: Negative.  Negative for altered mental status and depression.    Allergic/Immunologic: Negative.        Allergies   Allergen Reactions    Ciprofloxacin Diarrhea         Current Outpatient Medications:     acetaminophen (TYLENOL) 325 MG tablet, Take 2 tablets by mouth Every Night., Disp: , Rfl:  "    allopurinol (ZYLOPRIM) 300 MG tablet, Take 1 tablet by mouth Daily., Disp: 90 tablet, Rfl: 3    aspirin 81 MG tablet, Take 1 tablet by mouth Daily., Disp: , Rfl:     atorvastatin (LIPITOR) 80 MG tablet, TAKE 1 TABLET EVERY NIGHT, Disp: 90 tablet, Rfl: 3    carvedilol (COREG) 3.125 MG tablet, TAKE 1 TABLET TWICE DAILY  WITH MEALS, Disp: 180 tablet, Rfl: 3    Cholecalciferol 2000 units capsule, Take 1 capsule by mouth Daily., Disp: , Rfl:     clopidogrel (PLAVIX) 75 MG tablet, Take 1 tablet by mouth Daily., Disp: , Rfl:     famotidine (PEPCID) 20 MG tablet, Take 1 tablet by mouth Daily., Disp: , Rfl:     FLUoxetine (PROzac) 20 MG capsule, Take 1 capsule by mouth Daily., Disp: , Rfl:     furosemide (LASIX) 40 MG tablet, Take 1 tablet by mouth Daily., Disp: 30 tablet, Rfl: 3    isosorbide mononitrate (IMDUR) 30 MG 24 hr tablet, Take 1 tablet by mouth Daily., Disp: 30 tablet, Rfl: 3    loratadine (Claritin) 10 MG tablet, mg Tab, Oral, Daily, 0 Refill(s), Disp: , Rfl:     Multiple Vitamins-Minerals (PRESERVISION AREDS 2 PO), Take  by mouth 2 (Two) Times a Day., Disp: , Rfl:     NIFEdipine XL (PROCARDIA XL) 30 MG 24 hr tablet, TAKE ONE TABLET BY MOUTH DAILY, Disp: 90 tablet, Rfl: 3    nitroglycerin (Nitrostat) 0.4 MG SL tablet, Take 1 tablet by mouth Every 5 (Five) Minutes As Needed (CHEST PAIN: MAX DOSE 3 TABLETS)., Disp: 25 tablet, Rfl: 6    omega-3 acid ethyl esters (LOVAZA) 1 g capsule, TAKE 4 CAPSULES DAILY (TO  HOLD MEDICATION PRIOR TO   OPERATING ROOM), Disp: 360 capsule, Rfl: 3    ramipril (ALTACE) 10 MG capsule, TAKE 1 CAPSULE EVERY       EVENING, Disp: 90 capsule, Rfl: 3    tamsulosin (FLOMAX) 0.4 MG capsule 24 hr capsule, 1 capsule., Disp: , Rfl:       Objective:     Vitals:    02/01/24 0953   Height: 180.3 cm (71\")     Body mass index is 22.6 kg/m².    PHYSICAL EXAM:    Constitutional:       Appearance: Not in distress. Frail.   Neck:      Vascular: No JVR. JVD normal.   Pulmonary:      Effort: Pulmonary " effort is normal.      Breath sounds: Normal breath sounds. No wheezing. No rhonchi. No rales.   Chest:      Chest wall: Not tender to palpatation.   Cardiovascular:      PMI at left midclavicular line. Normal rate. Regular rhythm. Normal S1. Normal S2.       Murmurs: There is a systolic murmur.      No gallop.  No click. No rub.   Pulses:     Intact distal pulses.   Edema:     Peripheral edema absent.   Abdominal:      General: Bowel sounds are normal.      Palpations: Abdomen is soft.      Tenderness: There is no abdominal tenderness.   Musculoskeletal: Normal range of motion.         General: No tenderness. Skin:     General: Skin is warm and dry.   Neurological:      General: No focal deficit present.      Mental Status: Alert and oriented to person, place and time.           ECG 12 Lead    Date/Time: 2/1/2024 10:21 AM  Performed by: Evelio Mcneal MD    Authorized by: Evelio Mcneal MD  Comparison: compared with previous ECG from 1/26/2024  Similar to previous ECG  Rhythm: sinus rhythm and paced  Rate: normal  QRS axis: left  Comments: Inferior infarct. Lateral t abnormalities          12/2023  The ejection fraction biplane was calculated at 49%.   Mild left ventricular hypertrophy.   The estimated ejection fraction is 45-50%.   The right ventricular chamber size and systolic function are within normal limits.   The peak instantaneous gradient of the aortic valve is 24 mmHg. The mean gradient of the aortic valve is 16 mmHg. The aortic valve area, by VTI, is calculated at 1.6 cm2.MIld to moderate aortic stenosis noted                        2D Echocardiogram 03/03/2023:    Left ventricular systolic function is normal. Calculated left ventricular EF = 60.1% Normal left ventricular cavity size noted. Left ventricular wall thickness is consistent with concentric hypertrophy. Left ventricular diastolic function is consistent with (grade I) impaired relaxation.    Mildly reduced right ventricular  systolic function noted.    The interatrial septum appears redundant. Saline test for shunting not performed. History of positive PFO    There is severe calcification of the aortic valve. No aortic valve regurgitation is present. Mild aortic valve stenosis is present. Aortic valve area is 1.38 cm2. Aortic valve mean pressure gradient is 17.5 mmHg. Aortic valve dimensionless index is 0.30 .    Severe mitral annular calcification is present. There is severe, bileaflet mitral valve thickening present. Trace mitral valve regurgitation is present. No significant mitral valve stenosis is present.    There is a probable left pleural (less likely pericardial) effusion. This is not well visualized.    Lexiscan Stress test 07/13/2022:  Myocardial perfusion imaging indicates a normal myocardial perfusion study with no evidence of ischemia.  Left ventricular ejection fraction is normal. (Calculated EF = 63%).  Diaphragmatic attenuation artifact is present.  There is no prior study available for comparison.      Assessment:      Plan:       1.  Coronary artery disease: Status post CABG x 3 (SVG to LAD, SVG to OM1 and SVG to OM 2) in 1991.  Status post PCI of SVG to OM 2 (2015).  Unsuccessful attempted PCI of SVG to OM1 in December 2023 in the Ye system complicated by vessel closure.  The SVG to LAD and SVG to OM 2 looked fine.  - Continue aspirin 81 mg p.o. daily lifelong  - Continue atorvastatin 80 mg p.o. nightly.  - Continue carvedilol 3.125 mg p.o. every 12 hours along with Imdur therapy.    2.  Nonrheumatic aortic valve stenosis: Mild.  Transthoracic echocardiogram March 3, 2023.  Plan on repeat echo in 2 to 3 years.    3.  Hypertension: Controlled    4.  Hyperlipidemia: Lipid panel in target range 2/2023.  Continue atorvastatin 80 mg p.o. nightly.  Transaminases normal.  No significant myalgias.    5.  Sick sinus syndrome: Pacemaker placement in 2022.  Last remote download shows no events.      6.  Ischemic  cardiomyopathy: On appropriate goal-directed medical therapy.  Ejection fraction 45%.  Continue Lasix 40 mg p.o. daily.  Repeat BMP ordered.  - Continue carvedilol and ramipril at current dose.  No changes indicated.       Your medication list            Accurate as of February 1, 2024  9:57 AM. If you have any questions, ask your nurse or doctor.                CONTINUE taking these medications        Instructions Last Dose Given Next Dose Due   acetaminophen 325 MG tablet  Commonly known as: TYLENOL      Take 2 tablets by mouth Every Night.       allopurinol 300 MG tablet  Commonly known as: ZYLOPRIM      Take 1 tablet by mouth Daily.       aspirin 81 MG tablet      Take 1 tablet by mouth Daily.       atorvastatin 80 MG tablet  Commonly known as: LIPITOR      TAKE 1 TABLET EVERY NIGHT       carvedilol 3.125 MG tablet  Commonly known as: COREG      TAKE 1 TABLET TWICE DAILY  WITH MEALS       Cholecalciferol 50 MCG (2000 UT) capsule      Take 1 capsule by mouth Daily.       Claritin 10 MG tablet  Generic drug: loratadine      mg Tab, Oral, Daily, 0 Refill(s)       clopidogrel 75 MG tablet  Commonly known as: PLAVIX      Take 1 tablet by mouth Daily.       famotidine 20 MG tablet  Commonly known as: PEPCID      Take 1 tablet by mouth Daily.       FLUoxetine 20 MG capsule  Commonly known as: PROzac      Take 1 capsule by mouth Daily.       furosemide 40 MG tablet  Commonly known as: LASIX      Take 1 tablet by mouth Daily.       isosorbide mononitrate 30 MG 24 hr tablet  Commonly known as: IMDUR      Take 1 tablet by mouth Daily.       NIFEdipine XL 30 MG 24 hr tablet  Commonly known as: PROCARDIA XL      TAKE ONE TABLET BY MOUTH DAILY       nitroglycerin 0.4 MG SL tablet  Commonly known as: Nitrostat      Take 1 tablet by mouth Every 5 (Five) Minutes As Needed (CHEST PAIN: MAX DOSE 3 TABLETS).       omega-3 acid ethyl esters 1 g capsule  Commonly known as: LOVAZA      TAKE 4 CAPSULES DAILY (TO  HOLD MEDICATION PRIOR  TO   OPERATING ROOM)       PRESERVISION AREDS 2 PO      Take  by mouth 2 (Two) Times a Day.       ramipril 10 MG capsule  Commonly known as: ALTACE      TAKE 1 CAPSULE EVERY       EVENING       tamsulosin 0.4 MG capsule 24 hr capsule  Commonly known as: FLOMAX      1 capsule.               Yes

## 2024-03-10 ENCOUNTER — NON-APPOINTMENT (OUTPATIENT)
Age: 79
End: 2024-03-10

## 2024-04-02 NOTE — PROCEDURE
PAST SURGICAL HISTORY:   Section , 2009,    
[FreeTextEntry1] : PFts : severe air flow obstruction with reversible component, hyperinflation, impaired DL which corrects\par \par CT scan 1/20:  mild emphysema, granulomas, prom pulmonary artery

## 2024-04-29 RX ORDER — UMECLIDINIUM BROMIDE AND VILANTEROL TRIFENATATE 62.5; 25 UG/1; UG/1
62.5-25 POWDER RESPIRATORY (INHALATION)
Qty: 3 | Refills: 3 | Status: ACTIVE | COMMUNITY
Start: 2020-06-05 | End: 1900-01-01

## 2024-05-20 ENCOUNTER — NON-APPOINTMENT (OUTPATIENT)
Age: 79
End: 2024-05-20

## 2024-05-29 ENCOUNTER — APPOINTMENT (OUTPATIENT)
Dept: PULMONOLOGY | Facility: CLINIC | Age: 79
End: 2024-05-29

## 2024-05-29 ENCOUNTER — APPOINTMENT (OUTPATIENT)
Dept: PULMONOLOGY | Facility: CLINIC | Age: 79
End: 2024-05-29
Payer: MEDICARE

## 2024-05-29 VITALS
OXYGEN SATURATION: 94 % | RESPIRATION RATE: 16 BRPM | DIASTOLIC BLOOD PRESSURE: 82 MMHG | HEART RATE: 72 BPM | SYSTOLIC BLOOD PRESSURE: 134 MMHG

## 2024-05-29 VITALS — BODY MASS INDEX: 27.29 KG/M2 | HEIGHT: 58 IN | WEIGHT: 130 LBS

## 2024-05-29 VITALS — OXYGEN SATURATION: 95 %

## 2024-05-29 DIAGNOSIS — R06.09 OTHER FORMS OF DYSPNEA: ICD-10-CM

## 2024-05-29 DIAGNOSIS — J44.89 OTHER SPECIFIED CHRONIC OBSTRUCTIVE PULMONARY DISEASE: ICD-10-CM

## 2024-05-29 DIAGNOSIS — J44.9 CHRONIC OBSTRUCTIVE PULMONARY DISEASE, UNSPECIFIED: ICD-10-CM

## 2024-05-29 PROCEDURE — 99214 OFFICE O/P EST MOD 30 MIN: CPT | Mod: 25

## 2024-05-29 PROCEDURE — 94010 BREATHING CAPACITY TEST: CPT

## 2024-05-29 RX ORDER — ALBUTEROL SULFATE 90 UG/1
108 (90 BASE) INHALANT RESPIRATORY (INHALATION)
Qty: 1 | Refills: 5 | Status: ACTIVE | COMMUNITY
Start: 2024-05-29 | End: 1900-01-01

## 2024-05-29 NOTE — PROCEDURE
[FreeTextEntry1] : CT scan 1/20:  mild emphysema, granulomas, prom pulmonary artery   CXR 10/23  no active disease CVXR 5/24 no sig change from 2022, mild cardiomegaly spirometry today improved from June/23, now mild air flow obstruction

## 2024-05-29 NOTE — HISTORY OF PRESENT ILLNESS
[Former] : former [TextBox_4] : 30 pack yr smoker, quit 30 yrs ago using anoro daily, off Pulmicort Remains  on Xarelto, PAF no chest pain or sputum no fever, chill, chest pain or sputum Cardiology Dr Cardenas, echo planned 6 months  5/29/24 Anoro daily Xarelto no bleeding no fever, chill, chest pain has dyspepsia. more sedentary post URI

## 2024-05-29 NOTE — CONSULT LETTER
[Dear  ___] : Dear  [unfilled], [Consult Letter:] : I had the pleasure of evaluating your patient, [unfilled]. [Please see my note below.] : Please see my note below. [Sincerely,] : Sincerely, [DrDaphne  ___] : Dr. BENNETT [DrDaphne ___] : Dr. BENNETT [FreeTextEntry3] : [facility] DO [facility]\par  Pulmonary Critical Care\par  Director Pulmonary Division\par  Medical Director Respiratory Therapy\par  [facility]\par  \par

## 2024-05-29 NOTE — DISCUSSION/SUMMARY
[FreeTextEntry1] :  COPD with asthmatic component, now Gold grade 2, at baseline no sputum, fevers, chest pain, recently saw cardiology Using Anoro daily, denies benefit of Pulmicort addition so she stopped currently lung exam without bronchospasm , normal sp02 Doing well on anoro, spirometry mild obstruction on spirometry, flows are improved underlying persistent AF on full AC and spinal stenosis by hx CXR 5/24-10/23 no acute pathology, quit smoking 1990s Discussed sleep study, daughter will check on for heavy snoring, apneas Cardiology following , no recent falls, using rolling walker 4  months or sooner if needed

## 2024-07-29 NOTE — H&P ADULT - PROBLEM SELECTOR PLAN 3
Speech Therapy      Visit Type: Evaluation and Re-evaluation  -  Oral mechanism, clinical swallow and communication/cognition  Reason for consult:     Skilled ST consulted for cog-comm eval for 54 YOM presenting with speech difficulty found to have L ICA distribution strokes. CTA with severe ICA stenosis.    CEA 7/26/24.    stat MR brain/MRA head/neck ordered given worsening neurologic exam.  Brain MRI 7/28/24:  1.   New small to moderate acute infarct in the left precentral gyrus with additional smaller acute infarcts in the anterior left frontal lobe white matter and left basal ganglia. No acute intracranial hemorrhage.  2.   Redemonstrated evolving acute to subacute infarcts in the left frontal lobe.  3.   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  4.   Decreased flow throughout the left middle cerebral artery without definite focal occlusion.  5.   New complete occlusion of the left branch of the trifurcated anterior cerebral arteries at its origin.  6.   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      Relevant History/Co-morbidities: No documented hx of skilled ST services. Per RN, prior to this hospitalization, pt was independent living at home with his wife.    SUBJECTIVE  - Patient agreed to participate in therapy this date.    Okay to see pt per RN. Pt asleep bedside. Arousable and pleasantly cooperative. Aphasic.    Room air, afebrile.      Functional Cognition:    - Expression is verbal.     - Following commands intact.      Affect/Behavior: alert, appropriate, calm, pleasant and cooperative    Pain at onset of session:   Patient reports pain is not an issue/concern.    -  0/10       OBJECTIVE     Oral Mechanism   Oral Motor Assessment: generalized weakness    Cranial Nerve Exam - Speech & Swallow  Trigeminal Nerve (V)  - Motor: symmetrical jaw opening  Facial Nerve (VII)  - Sensory: secretions -  intact  Glossopharyngeal (IX)(motor) & Vagus (X) (motor)  - Laryngeal Mechanism: functional voice  Hypoglossal (XII)  - Motor: symmetrical protrusion    Diet prior to visit: Liquid- Thin and Regular  - Dentition: intact  - Feeding: feeds self and needs assist for feeding    Swallow  No temperature spike noted.  Patient positioning: upright in bed  Pretrial vocal quality: normal No pain associated with swallow.    Numbers listed with consistency indicate IDDSI diet level.    Thin (0); cup; straw; single swallow; self fed; SLP fed   - Oral phase: WFL   - Pharyngeal phase: clinically unremarkable  Small, single sips.    Pureed (4); teaspoon; self fed; SLP fed   - Oral: WFL   - Pharyngeal phase: clinically unremarkable    Regular; self fed   - Oral: WFL   - Pharyngeal phase: clinically unremarkable    Esophageal symptoms: not present      Motor Speech    - Articulation: intact   - Dysarthria: not present   - Phonation: Normal   - Resonance: within normal limits  Respiration    - Coordination of respiration and phonation: Intact.    Communication/Cognition  Behavior/Social Interaction:    - Intact (> 90% accuracy)  Expression-Verbal:     - Initiation: not impaired    - Automatic social/serial: impaired     - Automatic completion: impaired    - Repetition-words: intact (>90% accuracy)    - Counting #1-10 100% acc with visual model.  AYAN 100% acc with verbal cues.  Can repeat first and last name but difficulty initiating on his own.  Phonemic paraphasias present.  Naming:      - Confrontation: intact (>90% accuracy) (5/5)    - Responsive: impaired (50% acc independent increasing to 75% acc with phonemic cue)  Auditory Comprehension:      - Localizes to sound: Yes    - 1 step directions 100% acc independent.  [place] y/n questions (via head nod) 100% acc independent.  Abstract y/n questions 40% acc independent.  Interfering Components: language skills               Education:   - Present and ready to learn:  patient  Education provided during session:  - role of SLP, cognition, communication, dysphagia, aspiration precautions, oral care and aphasia  - Results of above outlined education: Verbalizes understanding and Needs reinforcement    ASSESSMENT  Progress: Slow progress  Interferring components: current medical conditions    Discharge needs based on today's assessment:  - Current level of function: significantly below baseline level of function  - Therapy needs at discharge: intensive daily therapy  - ADL / IADLs (activities / instrumental activities of daily living) requiring support at discharge: communication  - Impairments that require further therapy intervention: language and communication    Functional oral and suspected pharyngeal swallow response. Generalized oral motor weakness. Functional mastication, oral clearance, timely swallow and laryngeal elevation to palpation throughout. NO overt clinical s/sx of aspiration. RN reported some difficulty swallow pills this day; otherwise tolerated breakfast meal. Elevated risk to aspirate in setting of neuro medical status. Clinical presentation supports continuation of unmodified PO diet with standard swallow precautions with meds whole or crushed in puree. NO further dysphagia management warranted at this time.    Persisting, suspected mildly worsened acute receptive and expressive language impairment c/w aphasia; prior to CEA, pt presenting w/mild anomic aphasia (per WAB Bedside). Reassessment revealed impaired comprehension for abstract y/n questions. Impaired automatic speech and responsive naming, benefiting from visual and verbal cues and extra processing time. Verbal expression is single words, paraphasic, effortful and hesitant. Recommend ongoing skilled ST as pt is functioning below baseline.      PLAN (while hospitalized)  Aphasia tx +trial AAC        RECOMMENDATIONS     -Diet:          *Liquid- Thin and Regular    -Medication Administration:          *whole, crushed and with puree    -Feeding Guidelines:          *SUPERVISION -, total feed, encourage self feeding/assist as needed, POSITIONING -, sit up straight in bed/chair, STRATEGIES -, small bites, small single sips, slow rate of intake and limit distractions when eating    -Additional Swallow Recommendations:         *frequent oral care    -Speech Reviewed Swallow:         *with patient/family and with clinical caregivers (JARAD Woods)    -Communication Cognition:          *Patient continues to demonstrate acute communication and cognition deficits, will continue to follow.  Patient will require 24/7 supervision at discharge.  Patient would benefit from intensive rehab program.      GOALS    Long Term Goals: (to be met by time of discharge from hospital)  Pt will generate simple sentences given target word or by describing a picture in 7/10 opportunities given min cues to facilitate improved verbal expression, fluency, and content - ONGOING.    Pt will answer abstract y/n questions w/60% acc given min cues to facilitate improved receptive language - INITIAL 7/29/24.    Pt will complete automatic speech tasks w/60% acc given max cues to facilitate improved verbal expression to communicate wants/needs - INITIAL 7/29/24.        Documented in the chart in the following areas:    Assessment.    Patient at End of Session:   Location: in bed  Safety measures: alarm system in place/re-engaged, bed rails x4, equipment intact, lines intact and call light within reach  Handoff to: nurse      Therapy procedure time and total treatment time can be found documented on the Time Entry flowsheet   rate controlled  c/w eliquis and metoprolol

## 2024-09-12 ENCOUNTER — OUTPATIENT (OUTPATIENT)
Dept: OUTPATIENT SERVICES | Facility: HOSPITAL | Age: 79
LOS: 1 days | End: 2024-09-12
Payer: MEDICARE

## 2024-09-12 VITALS
OXYGEN SATURATION: 96 % | HEART RATE: 64 BPM | SYSTOLIC BLOOD PRESSURE: 160 MMHG | DIASTOLIC BLOOD PRESSURE: 67 MMHG | RESPIRATION RATE: 18 BRPM

## 2024-09-12 VITALS
SYSTOLIC BLOOD PRESSURE: 151 MMHG | OXYGEN SATURATION: 98 % | DIASTOLIC BLOOD PRESSURE: 87 MMHG | HEART RATE: 76 BPM | TEMPERATURE: 98 F | WEIGHT: 131.84 LBS | RESPIRATION RATE: 21 BRPM | HEIGHT: 60 IN

## 2024-09-12 DIAGNOSIS — H43.11 VITREOUS HEMORRHAGE, RIGHT EYE: ICD-10-CM

## 2024-09-12 DIAGNOSIS — Z90.49 ACQUIRED ABSENCE OF OTHER SPECIFIED PARTS OF DIGESTIVE TRACT: Chronic | ICD-10-CM

## 2024-09-12 PROCEDURE — 67036 REMOVAL OF INNER EYE FLUID: CPT | Mod: RT

## 2024-09-12 PROCEDURE — 67036 REMOVAL OF INNER EYE FLUID: CPT | Mod: AS,RT

## 2024-09-12 RX ORDER — SERTRALINE HYDROCHLORIDE 50 MG/1
2 TABLET, FILM COATED ORAL
Refills: 0 | DISCHARGE

## 2024-09-12 NOTE — ASU PATIENT PROFILE, ADULT - FALL HARM RISK - FACTORS NURSING JUDGEMENT
Serum Creatinine 0.91 / eGFR 71 - without Anemia - without SHPT -  Avoid NSAIDS, Assure 64 oz of water daily, Meds per med list above    Yes

## 2024-09-12 NOTE — ASU PATIENT PROFILE, ADULT - NSICDXPASTMEDICALHX_GEN_ALL_CORE_FT
PAST MEDICAL HISTORY:  Aortic valve replaced     Atrial fibrillation     COPD (chronic obstructive pulmonary disease)     COPD (chronic obstructive pulmonary disease)     HLD (hyperlipidemia)

## 2024-09-12 NOTE — ASU DISCHARGE PLAN (ADULT/PEDIATRIC) - NURSING INSTRUCTIONS
Do not rub the eye   Follow up apt tomorrow  at : [address] [facility] [address] Do not rub the eye   Follow up apt tomorrow  at : [address] [facility] [address].  Eye shield with instructions , sunglasses and eye kit given to patient.

## 2024-09-12 NOTE — ASU PATIENT PROFILE, ADULT - FALL HARM RISK - HARM RISK INTERVENTIONS
Assistance with ambulation/Assistance OOB with selected safe patient handling equipment/Communicate Risk of Fall with Harm to all staff/Discuss with provider need for PT consult/Monitor gait and stability/Provide patient with walking aids - walker, cane, crutches/Reinforce activity limits and safety measures with patient and family/Review medications for side effects contributing to fall risk/Tailored Fall Risk Interventions/Visual Cue: Yellow wristband and red socks/Bed in lowest position, wheels locked, appropriate side rails in place/Call bell, personal items and telephone in reach/Instruct patient to call for assistance before getting out of bed or chair/Non-slip footwear when patient is out of bed/[place] to call system/Physically safe environment - no spills, clutter or unnecessary equipment/Purposeful Proactive Rounding/Room/bathroom lighting operational, light cord in reach

## 2024-09-12 NOTE — ASU PREOP CHECKLIST - IDENTIFICATION BAND VERIFIED
[CC: acute hypoxic resp failure due to  COVID-19  intractable back pain    HPI: 51 year old female w hx breast CA s/p lumpectomy, RT on tamoxifen,, DM II, HTN came to ED in wheelchair c/o LBP  pain is  left lower back radiates down into left posterior thigh, started Saturday   Has been unable to walk due to pain  + cough x 2 days.  Seen in super track, found to be hypoxic to 87% on room air and brought to the main  denies recent travel or sick contacts    In ED /72      RR 20   T 99.6   89% sat RA  2 L nc  CXR no acute disease; CTA no MT, no PNA  solumedrol 40 mg then decadron 6 mg    She is being admitted for acute hypoxic resp failure due to COVID-19, intractable LBP L          INTERVAL HPI/ OVERNIGHT EVENTS:  Pt was seen and examined, r reports pain is getting better, ws able to ambulate with PT today with some pain, SOB is improving, POC discussed     Vital Signs Last 24 Hrs  T(C): 36.6 (18 Jul 2024 07:37), Max: 37.1 (17 Jul 2024 15:43)  T(F): 97.9 (18 Jul 2024 07:37), Max: 98.8 (17 Jul 2024 15:43)  HR: 120 (18 Jul 2024 11:00) (93 - 120)  BP: 134/77 (18 Jul 2024 07:37) (106/54 - 137/88)  BP(mean): 100 (17 Jul 2024 23:19) (100 - 100)  RR: 20 (18 Jul 2024 11:00) (18 - 20)  SpO2: 95% (18 Jul 2024 11:00) (93% - 95%)    Parameters below as of 18 Jul 2024 11:00  Patient On (Oxygen Delivery Method): room air      REVIEW OF SYSTEMS:  All other review of systems is negative unless indicated above.        PHYSICAL EXAM:  General: in no acute distress  Eyes: , EOMI; conjunctiva and sclera clear  Head: Normocephalic; atraumatic  ENMT: No nasal discharge; airway clear  Respiratory: decreased BS,  No wheezes  Cardiovascular: Regular rate and rhythm. S1 and S2 Normal; No murmur  Gastrointestinal: Soft non-tender non-distended; Normal bowel sounds  Genitourinary: No  suprapubic  tenderness  Extremities: No r edema  Neurological: Alert and oriented x 3, non focal   Skin: Warm and dry. No acute rash  Musculoskeletal: Normal muscle tone, L upper thight/L hip tender with palpation, limited MARIUSZ       LABS:                           16.3   17.18 )-----------( 242      ( 17 Jul 2024 09:18 )             50.0     07-18    x   |  x   |  x   ----------------------------<  x   x    |  x   |  1.02    Ca    9.1      17 Jul 2024 09:18    TPro  7.4  /  Alb  2.8<L>  /  TBili  0.5  /  DBili  <0.1  /  AST  14<L>  /  ALT  23  /  AlkPhos  69  07-18        LIVER FUNCTIONS - ( 18 Jul 2024 06:46 )  Alb: 2.8 g/dL / Pro: 7.4 gm/dL / ALK PHOS: 69 U/L / ALT: 23 U/L / AST: 14 U/L / GGT: x           PT/INR - ( 18 Jul 2024 06:46 )   PT: 11.9 sec;   INR: 1.05 ratio           Urinalysis Basic - ( 17 Jul 2024 09:18 )    Color: x / Appearance: x / SG: x / pH: x  Gluc: 232 mg/dL / Ketone: x  / Bili: x / Urobili: x   Blood: x / Protein: x / Nitrite: x   Leuk Esterase: x / RBC: x / WBC x   Sq Epi: x / Non Sq Epi: x / Bacteria: x                            16.3   17.18 )-----------( 242      ( 17 Jul 2024 09:18 )             50.0     07-17    140  |  107  |  28<H>  ----------------------------<  232<H>  4.2   |  24  |  0.82    Ca    9.1      17 Jul 2024 09:18  Phos  3.6     07-16  Mg     2.0     07-16    TPro  7.0  /  Alb  2.8<L>  /  TBili  0.5  /  DBili  <0.1  /  AST  12<L>  /  ALT  20  /  AlkPhos  71  07-17        LIVER FUNCTIONS - ( 17 Jul 2024 09:18 )  Alb: 2.8 g/dL / Pro: 7.0 gm/dL / ALK PHOS: 71 U/L / ALT: 20 U/L / AST: 12 U/L / GGT: x           PT/INR - ( 17 Jul 2024 09:18 )   PT: 11.4 sec;   INR: 1.01 ratio           Urinalysis Basic - ( 17 Jul 2024 09:18 )    Color: x / Appearance: x / SG: x / pH: x  Gluc: 232 mg/dL / Ketone: x  / Bili: x / Urobili: x   Blood: x / Protein: x / Nitrite: x   Leuk Esterase: x / RBC: x / WBC x   Sq Epi: x / Non Sq Epi: x / Bacteria: x          CARDIAC MARKERS ( 15 Jul 2024 19:42 )  x     / x     / 45 U/L / x     / x                                16.8   13.46 )-----------( 190      ( 15 Jul 2024 12:07 )             51.5     16 Jul 2024 07:59    x      |  x      |  x      ----------------------------<  x      x       |  x      |  0.69     Ca    9.1        15 Jul 2024 12:07  Phos  3.6       16 Jul 2024 07:59  Mg     2.0       16 Jul 2024 07:59    TPro  7.2    /  Alb  2.9    /  TBili  0.5    /  DBili  0.1    /  AST  15     /  ALT  21     /  AlkPhos  75     16 Jul 2024 07:59    PT/INR - ( 16 Jul 2024 07:59 )   PT: 11.2 sec;   INR: 0.99 ratio         PTT - ( 15 Jul 2024 12:07 )  PTT:26.9 sec      LIVER FUNCTIONS - ( 16 Jul 2024 07:59 )  Alb: 2.9 g/dL / Pro: 7.2 gm/dL / ALK PHOS: 75 U/L / ALT: 21 U/L / AST: 15 U/L / GGT: x           Urinalysis Basic - ( 15 Jul 2024 13:53 )    Color: Yellow / Appearance: Clear / SG: >1.030 / pH: x  Gluc: x / Ketone: Trace mg/dL  / Bili: Negative / Urobili: 0.2 mg/dL   Blood: x / Protein: Negative mg/dL / Nitrite: Negative   Leuk Esterase: Negative / RBC: x / WBC x   Sq Epi: x / Non Sq Epi: x / Bacteria: x    CAPILLARY BLOOD GLUCOSE  POCT Blood Glucose.: 234 mg/dL (18 Jul 2024 12:09)  POCT Blood Glucose.: 167 mg/dL (18 Jul 2024 08:04)      MEDICATIONS  (STANDING):  amLODIPine   Tablet 10 milliGRAM(s) Oral daily  aspirin enteric coated 81 milliGRAM(s) Oral daily  chlorhexidine 4% Liquid 1 Application(s) Topical <User Schedule>  dexAMETHasone  Injectable 6 milliGRAM(s) IV Push daily  dextrose 5%. 1000 milliLiter(s) (100 mL/Hr) IV Continuous <Continuous>  dextrose 5%. 1000 milliLiter(s) (50 mL/Hr) IV Continuous <Continuous>  dextrose 50% Injectable 25 Gram(s) IV Push once  dextrose 50% Injectable 25 Gram(s) IV Push once  dextrose 50% Injectable 12.5 Gram(s) IV Push once  enoxaparin Injectable 110 milliGRAM(s) SubCutaneous every 12 hours  furosemide    Tablet 20 milliGRAM(s) Oral daily  glucagon  Injectable 1 milliGRAM(s) IntraMuscular once  insulin lispro (ADMELOG) corrective regimen sliding scale   SubCutaneous three times a day before meals  insulin lispro (ADMELOG) corrective regimen sliding scale   SubCutaneous at bedtime  losartan 100 milliGRAM(s) Oral daily  remdesivir  IVPB 100 milliGRAM(s) IV Intermittent every 24 hours  remdesivir  IVPB   IV Intermittent   tamoxifen 20 milliGRAM(s) Oral daily    MEDICATIONS  (PRN):  acetaminophen     Tablet .. 650 milliGRAM(s) Oral every 4 hours PRN Temp greater or equal to 38.5C (101.3F)  cyclobenzaprine 10 milliGRAM(s) Oral three times a day PRN Muscle Spasm  dextrose Oral Gel 15 Gram(s) Oral once PRN Blood Glucose LESS THAN 70 milliGRAM(s)/deciliter  ketorolac   Injectable 15 milliGRAM(s) IV Push every 6 hours PRN Moderate Pain (4 - 6)  meclizine 12.5 milliGRAM(s) Oral three times a day PRN for dizziness      RADIOLOGY & ADDITIONAL TESTS:      ACC: [id number] EXAM:  XR CHEST PORTABLE IMMED 1V   ORDERED BY: MARK CRYSTAL     PROCEDURE DATE:  07/15/2024          INTERPRETATION:  Sepsis.    AP chest. Prior 7/6/2024    Cardiac silhouette and pulmonary vascularity exaggerated by AP film   shallow inspiration. Elevation right hemidiaphragm similar to prior.   Grossly clear lungs.    IMPRESSION: Grossly clear lungs        ACC: [id number] EXAM:  CT ABDOMEN AND PELVIS IC   ORDERED BY: MAKAYLA DAVID     ACC: [id number] EXAM:  CT ANGIO CHEST PULM ART WAWIC   ORDERED BY: MAKAYLA DAVID     PROCEDURE DATE:  07/15/2024          INTERPRETATION:  CTA CHEST AND CT ABDOMEN AND PELVIS    INDICATION: Chest pain, hypoxia, fever, lower back pain    TECHNIQUE: Helical images of the chest, abdomen, and pelvis were obtained   before and after the administration of 90 mL of Omnipaque 350. Maximum   intensity projection images were generated.    COMPARISON: None.    FINDINGS:    CT CHEST:    HEART/VASCULATURE: Normal sized heart and aorta. No pericardial effusion.   No pulmonary embolus.    LUNGS/AIRWAYS/PLEURA: No endobronchial lesion. Low lung volumes. Clear   lungs. No pleural abnormality. Elevated right hemidiaphragm.    LYMPH NODES/MEDIASTINUM: No lymphadenopathy.    BONES/SOFT TISSUES: Unremarkable.        CT ABDOMEN/PELVIS:    LIVER: Unremarkable.    BILIARY SYSTEM: Unremarkable.    SPLEEN:Unremarkable.    PANCREAS: Unremarkable.    ADRENALS: Unremarkable.    KIDNEYS/URINARY TRACT: Unremarkable.    GASTROINTESTINAL TRACT: Unremarkable.    REPRODUCTIVE ORGANS: Unremarkable uterus. Bilateral adnexal   calcifications.    LYMPH NODES/PERITONEUM: Unremarkable.    VASCULATURE: Normal aortic size. Patent portal veins.    BONES/SOFT TISSUES: No bone lesion. Mild nonspecific stranding of the   subcutaneous fat in the right abdominal wall.      IMPRESSION:    No pulmonary embolus.    No pneumonia.    Nonspecific mild stranding of the subcutaneous fat in the right anterior   abdominal wall. Correlate for cellulitis.   done

## 2024-09-12 NOTE — ASU DISCHARGE PLAN (ADULT/PEDIATRIC) - NS MD DC FALL RISK RISK
For information on Fall & Injury Prevention, visit: [email or web address] OR  [email or web address] OR  https://www.cdc.gov/steadi/patient.html

## 2024-09-12 NOTE — ASU DISCHARGE PLAN (ADULT/PEDIATRIC) - PROVIDER TOKENS
PROVIDER:[TOKEN:[[id number]],SCHEDULEDAPPT:[09/13/2024],SCHEDULEDAPPTTIME:[02:30 AM]] PROVIDER:[TOKEN:[[id number]],SCHEDULEDAPPT:[09/13/2024],SCHEDULEDAPPTTIME:[02:30 AM]]

## 2024-09-12 NOTE — ASU DISCHARGE PLAN (ADULT/PEDIATRIC) - CARE PROVIDER_API CALL
BERNADETTE ALONZO  [address] [place] [address]  Phone: [phone]  Fax: [phone]  Scheduled Appointment: 09/13/2024 02:30 AM   Jun Woodall  Ophthalmology  [address] [place] [address], [address]  [place] [address]  Phone: [phone]  Fax: [phone]  Scheduled Appointment: 09/13/2024 02:30 AM

## 2024-11-04 ENCOUNTER — APPOINTMENT (OUTPATIENT)
Dept: PULMONOLOGY | Facility: CLINIC | Age: 79
End: 2024-11-04
Payer: MEDICARE

## 2024-11-04 VITALS — HEART RATE: 52 BPM

## 2024-11-04 VITALS
SYSTOLIC BLOOD PRESSURE: 140 MMHG | BODY MASS INDEX: 26.66 KG/M2 | DIASTOLIC BLOOD PRESSURE: 82 MMHG | RESPIRATION RATE: 16 BRPM | WEIGHT: 127 LBS | HEIGHT: 58 IN | HEART RATE: 49 BPM | OXYGEN SATURATION: 97 %

## 2024-11-04 DIAGNOSIS — J44.89 OTHER SPECIFIED CHRONIC OBSTRUCTIVE PULMONARY DISEASE: ICD-10-CM

## 2024-11-04 PROCEDURE — G2211 COMPLEX E/M VISIT ADD ON: CPT

## 2024-11-04 PROCEDURE — 99213 OFFICE O/P EST LOW 20 MIN: CPT

## 2024-11-05 PROBLEM — E78.5 HYPERLIPIDEMIA, UNSPECIFIED: Chronic | Status: ACTIVE | Noted: 2024-09-12

## 2024-12-25 PROBLEM — F10.90 ALCOHOL USE: Status: ACTIVE | Noted: 2020-08-25

## 2025-04-15 NOTE — H&P ADULT - PROBLEM SELECTOR PLAN 2
pt meeting sepsis critiria with fever and leukocytosis  likely 2/2 viral illness  cxr clear, ua negative  f/u blood cultures. Patient

## 2025-05-14 ENCOUNTER — NON-APPOINTMENT (OUTPATIENT)
Age: 80
End: 2025-05-14

## 2025-07-18 ENCOUNTER — NON-APPOINTMENT (OUTPATIENT)
Age: 80
End: 2025-07-18

## 2025-07-31 ENCOUNTER — INPATIENT (INPATIENT)
Facility: HOSPITAL | Age: 80
LOS: 2 days | Discharge: HOME HEALTH | DRG: 87 | End: 2025-08-03
Attending: STUDENT IN AN ORGANIZED HEALTH CARE EDUCATION/TRAINING PROGRAM | Admitting: STUDENT IN AN ORGANIZED HEALTH CARE EDUCATION/TRAINING PROGRAM
Payer: MEDICARE

## 2025-07-31 VITALS
WEIGHT: 160.06 LBS | HEART RATE: 86 BPM | OXYGEN SATURATION: 95 % | SYSTOLIC BLOOD PRESSURE: 142 MMHG | DIASTOLIC BLOOD PRESSURE: 89 MMHG | RESPIRATION RATE: 16 BRPM | TEMPERATURE: 99 F

## 2025-07-31 DIAGNOSIS — Z90.49 ACQUIRED ABSENCE OF OTHER SPECIFIED PARTS OF DIGESTIVE TRACT: Chronic | ICD-10-CM

## 2025-07-31 DIAGNOSIS — S06.5X0A TRAUMATIC SUBDURAL HEMORRHAGE WITHOUT LOSS OF CONSCIOUSNESS, INITIAL ENCOUNTER: ICD-10-CM

## 2025-07-31 LAB
ALBUMIN SERPL ELPH-MCNC: 4.4 G/DL — SIGNIFICANT CHANGE UP (ref 3.3–5.2)
ALP SERPL-CCNC: 93 U/L — SIGNIFICANT CHANGE UP (ref 40–120)
ALT FLD-CCNC: 22 U/L — SIGNIFICANT CHANGE UP
ANION GAP SERPL CALC-SCNC: 14 MMOL/L — SIGNIFICANT CHANGE UP (ref 5–17)
APPEARANCE UR: CLEAR — SIGNIFICANT CHANGE UP
APTT BLD: 35.2 SEC — SIGNIFICANT CHANGE UP (ref 26.1–36.8)
AST SERPL-CCNC: 57 U/L — HIGH
BACTERIA # UR AUTO: NEGATIVE /HPF — SIGNIFICANT CHANGE UP
BASOPHILS # BLD AUTO: 0.03 K/UL — SIGNIFICANT CHANGE UP (ref 0–0.2)
BASOPHILS # BLD MANUAL: 0 K/UL — SIGNIFICANT CHANGE UP (ref 0–0.2)
BASOPHILS NFR BLD AUTO: 0.3 % — SIGNIFICANT CHANGE UP (ref 0–2)
BASOPHILS NFR BLD MANUAL: 0 % — SIGNIFICANT CHANGE UP (ref 0–2)
BILIRUB SERPL-MCNC: 1.3 MG/DL — SIGNIFICANT CHANGE UP (ref 0.4–2)
BILIRUB UR-MCNC: NEGATIVE — SIGNIFICANT CHANGE UP
BUN SERPL-MCNC: 12.8 MG/DL — SIGNIFICANT CHANGE UP (ref 8–20)
CALCIUM SERPL-MCNC: 9.9 MG/DL — SIGNIFICANT CHANGE UP (ref 8.4–10.5)
CHLORIDE SERPL-SCNC: 100 MMOL/L — SIGNIFICANT CHANGE UP (ref 96–108)
CK MB CFR SERPL CALC: 4.9 NG/ML — SIGNIFICANT CHANGE UP (ref 0–6.7)
CK SERPL-CCNC: 224 U/L — HIGH (ref 25–170)
CO2 SERPL-SCNC: 24 MMOL/L — SIGNIFICANT CHANGE UP (ref 22–29)
COLOR SPEC: YELLOW — SIGNIFICANT CHANGE UP
CREAT SERPL-MCNC: 0.65 MG/DL — SIGNIFICANT CHANGE UP (ref 0.5–1.3)
DIFF PNL FLD: ABNORMAL
EGFR: 88 ML/MIN/1.73M2 — SIGNIFICANT CHANGE UP
EGFR: 88 ML/MIN/1.73M2 — SIGNIFICANT CHANGE UP
EOSINOPHIL # BLD AUTO: 0.01 K/UL — SIGNIFICANT CHANGE UP (ref 0–0.5)
EOSINOPHIL # BLD MANUAL: 0 K/UL — SIGNIFICANT CHANGE UP (ref 0–0.5)
EOSINOPHIL NFR BLD AUTO: 0.1 % — SIGNIFICANT CHANGE UP (ref 0–6)
EOSINOPHIL NFR BLD MANUAL: 0 % — SIGNIFICANT CHANGE UP (ref 0–6)
GLUCOSE SERPL-MCNC: 124 MG/DL — HIGH (ref 70–99)
GLUCOSE UR QL: NEGATIVE MG/DL — SIGNIFICANT CHANGE UP
HCT VFR BLD CALC: 48.2 % — HIGH (ref 34.5–45)
HGB BLD-MCNC: 16 G/DL — HIGH (ref 11.5–15.5)
IMM GRANULOCYTES # BLD AUTO: 0.06 K/UL — SIGNIFICANT CHANGE UP (ref 0–0.07)
IMM GRANULOCYTES NFR BLD AUTO: 0.5 % — SIGNIFICANT CHANGE UP (ref 0–0.9)
INR BLD: 1.18 RATIO — HIGH (ref 0.85–1.16)
KETONES UR QL: ABNORMAL MG/DL
LEUKOCYTE ESTERASE UR-ACNC: NEGATIVE — SIGNIFICANT CHANGE UP
LYMPHOCYTES # BLD AUTO: 0.19 K/UL — LOW (ref 1–3.3)
LYMPHOCYTES # BLD MANUAL: 0.11 K/UL — LOW (ref 1–3.3)
LYMPHOCYTES NFR BLD AUTO: 1.6 % — LOW (ref 13–44)
LYMPHOCYTES NFR BLD MANUAL: 0.9 % — LOW (ref 13–44)
MANUAL SMEAR VERIFICATION: SIGNIFICANT CHANGE UP
MCHC RBC-ENTMCNC: 29.1 PG — SIGNIFICANT CHANGE UP (ref 27–34)
MCHC RBC-ENTMCNC: 33.2 G/DL — SIGNIFICANT CHANGE UP (ref 32–36)
MCV RBC AUTO: 87.8 FL — SIGNIFICANT CHANGE UP (ref 80–100)
MONOCYTES # BLD AUTO: 0.52 K/UL — SIGNIFICANT CHANGE UP (ref 0–0.9)
MONOCYTES # BLD MANUAL: 0.4 K/UL — SIGNIFICANT CHANGE UP (ref 0–0.9)
MONOCYTES NFR BLD AUTO: 4.4 % — SIGNIFICANT CHANGE UP (ref 2–14)
MONOCYTES NFR BLD MANUAL: 3.4 % — SIGNIFICANT CHANGE UP (ref 2–14)
MRSA PCR RESULT.: SIGNIFICANT CHANGE UP
NEUTROPHILS # BLD AUTO: 11.08 K/UL — HIGH (ref 1.8–7.4)
NEUTROPHILS # BLD MANUAL: 11.38 K/UL — HIGH (ref 1.8–7.4)
NEUTROPHILS NFR BLD AUTO: 93.1 % — HIGH (ref 43–77)
NEUTROPHILS NFR BLD MANUAL: 95.7 % — HIGH (ref 43–77)
NITRITE UR-MCNC: NEGATIVE — SIGNIFICANT CHANGE UP
NRBC # BLD AUTO: 0 K/UL — SIGNIFICANT CHANGE UP (ref 0–0)
NRBC # FLD: 0 K/UL — SIGNIFICANT CHANGE UP (ref 0–0)
NRBC BLD AUTO-RTO: 0 /100 WBCS — SIGNIFICANT CHANGE UP (ref 0–0)
PH UR: 6 — SIGNIFICANT CHANGE UP (ref 5–8)
PLAT MORPH BLD: NORMAL — SIGNIFICANT CHANGE UP
PLATELET # BLD AUTO: 129 K/UL — LOW (ref 150–400)
PLATELET COUNT - ESTIMATE: NORMAL — SIGNIFICANT CHANGE UP
PMV BLD: 10.4 FL — SIGNIFICANT CHANGE UP (ref 7–13)
POTASSIUM SERPL-MCNC: 4.5 MMOL/L — SIGNIFICANT CHANGE UP (ref 3.5–5.3)
POTASSIUM SERPL-SCNC: 4.5 MMOL/L — SIGNIFICANT CHANGE UP (ref 3.5–5.3)
PROT SERPL-MCNC: 8 G/DL — SIGNIFICANT CHANGE UP (ref 6.6–8.7)
PROT UR-MCNC: 300 MG/DL
PROTHROM AB SERPL-ACNC: 13.7 SEC — HIGH (ref 9.9–13.4)
RBC # BLD: 5.49 M/UL — HIGH (ref 3.8–5.2)
RBC # FLD: 14.1 % — SIGNIFICANT CHANGE UP (ref 10.3–14.5)
RBC BLD AUTO: NORMAL — SIGNIFICANT CHANGE UP
RBC CASTS # UR COMP ASSIST: 3 /HPF — SIGNIFICANT CHANGE UP (ref 0–4)
S AUREUS DNA NOSE QL NAA+PROBE: SIGNIFICANT CHANGE UP
SODIUM SERPL-SCNC: 138 MMOL/L — SIGNIFICANT CHANGE UP (ref 135–145)
SP GR SPEC: >1.03 — HIGH (ref 1–1.03)
SQUAMOUS # UR AUTO: 1 /HPF — SIGNIFICANT CHANGE UP (ref 0–5)
UROBILINOGEN FLD QL: 1 MG/DL — SIGNIFICANT CHANGE UP (ref 0.2–1)
WBC # BLD: 11.89 K/UL — HIGH (ref 3.8–10.5)
WBC # FLD AUTO: 11.89 K/UL — HIGH (ref 3.8–10.5)
WBC UR QL: 2 /HPF — SIGNIFICANT CHANGE UP (ref 0–5)

## 2025-07-31 PROCEDURE — 12011 RPR F/E/E/N/L/M 2.5 CM/<: CPT | Mod: GC

## 2025-07-31 PROCEDURE — 99291 CRITICAL CARE FIRST HOUR: CPT | Mod: 25,GC

## 2025-07-31 PROCEDURE — 81001 URINALYSIS AUTO W/SCOPE: CPT

## 2025-07-31 PROCEDURE — 99222 1ST HOSP IP/OBS MODERATE 55: CPT

## 2025-07-31 PROCEDURE — 82550 ASSAY OF CK (CPK): CPT

## 2025-07-31 PROCEDURE — 70486 CT MAXILLOFACIAL W/O DYE: CPT | Mod: 26

## 2025-07-31 PROCEDURE — 70486 CT MAXILLOFACIAL W/O DYE: CPT

## 2025-07-31 PROCEDURE — 80053 COMPREHEN METABOLIC PANEL: CPT

## 2025-07-31 PROCEDURE — 72125 CT NECK SPINE W/O DYE: CPT | Mod: 26

## 2025-07-31 PROCEDURE — 93005 ELECTROCARDIOGRAM TRACING: CPT

## 2025-07-31 PROCEDURE — 71045 X-RAY EXAM CHEST 1 VIEW: CPT | Mod: 26

## 2025-07-31 PROCEDURE — 85610 PROTHROMBIN TIME: CPT

## 2025-07-31 PROCEDURE — 70486 CT MAXILLOFACIAL W/O DYE: CPT | Mod: 26,77

## 2025-07-31 PROCEDURE — 70450 CT HEAD/BRAIN W/O DYE: CPT | Mod: 26

## 2025-07-31 PROCEDURE — 99223 1ST HOSP IP/OBS HIGH 75: CPT

## 2025-07-31 PROCEDURE — 85025 COMPLETE CBC W/AUTO DIFF WBC: CPT

## 2025-07-31 PROCEDURE — 71045 X-RAY EXAM CHEST 1 VIEW: CPT

## 2025-07-31 PROCEDURE — 74177 CT ABD & PELVIS W/CONTRAST: CPT | Mod: 26

## 2025-07-31 PROCEDURE — 71260 CT THORAX DX C+: CPT | Mod: 26

## 2025-07-31 PROCEDURE — 87641 MR-STAPH DNA AMP PROBE: CPT

## 2025-07-31 PROCEDURE — 73130 X-RAY EXAM OF HAND: CPT | Mod: 26,RT

## 2025-07-31 PROCEDURE — 71260 CT THORAX DX C+: CPT

## 2025-07-31 PROCEDURE — 36415 COLL VENOUS BLD VENIPUNCTURE: CPT

## 2025-07-31 PROCEDURE — 74177 CT ABD & PELVIS W/CONTRAST: CPT

## 2025-07-31 PROCEDURE — 72125 CT NECK SPINE W/O DYE: CPT

## 2025-07-31 PROCEDURE — 70450 CT HEAD/BRAIN W/O DYE: CPT

## 2025-07-31 PROCEDURE — 82553 CREATINE MB FRACTION: CPT

## 2025-07-31 PROCEDURE — 72170 X-RAY EXAM OF PELVIS: CPT

## 2025-07-31 PROCEDURE — 70450 CT HEAD/BRAIN W/O DYE: CPT | Mod: 26,77

## 2025-07-31 PROCEDURE — 85730 THROMBOPLASTIN TIME PARTIAL: CPT

## 2025-07-31 PROCEDURE — 87640 STAPH A DNA AMP PROBE: CPT

## 2025-07-31 PROCEDURE — 72170 X-RAY EXAM OF PELVIS: CPT | Mod: 26

## 2025-07-31 PROCEDURE — 73130 X-RAY EXAM OF HAND: CPT

## 2025-07-31 RX ORDER — ACETAMINOPHEN 500 MG/5ML
1000 LIQUID (ML) ORAL EVERY 6 HOURS
Refills: 0 | Status: COMPLETED | OUTPATIENT
Start: 2025-07-31 | End: 2025-08-01

## 2025-07-31 RX ORDER — METOPROLOL SUCCINATE 50 MG/1
100 TABLET, EXTENDED RELEASE ORAL DAILY
Refills: 0 | Status: DISCONTINUED | OUTPATIENT
Start: 2025-07-31 | End: 2025-08-03

## 2025-07-31 RX ORDER — MELATONIN 5 MG
5 TABLET ORAL AT BEDTIME
Refills: 0 | Status: DISCONTINUED | OUTPATIENT
Start: 2025-07-31 | End: 2025-08-03

## 2025-07-31 RX ORDER — ROSUVASTATIN CALCIUM 20 MG/1
10 TABLET, FILM COATED ORAL AT BEDTIME
Refills: 0 | Status: DISCONTINUED | OUTPATIENT
Start: 2025-07-31 | End: 2025-08-03

## 2025-07-31 RX ORDER — LEVETIRACETAM 10 MG/ML
500 INJECTION, SOLUTION INTRAVENOUS
Refills: 0 | Status: DISCONTINUED | OUTPATIENT
Start: 2025-07-31 | End: 2025-08-03

## 2025-07-31 RX ORDER — DONEPEZIL HYDROCHLORIDE 5 MG/1
10 TABLET ORAL AT BEDTIME
Refills: 0 | Status: DISCONTINUED | OUTPATIENT
Start: 2025-07-31 | End: 2025-08-03

## 2025-07-31 RX ORDER — ACETAMINOPHEN 500 MG/5ML
1000 LIQUID (ML) ORAL ONCE
Refills: 0 | Status: COMPLETED | OUTPATIENT
Start: 2025-07-31 | End: 2025-07-31

## 2025-07-31 RX ORDER — SERTRALINE 100 MG/1
200 TABLET, FILM COATED ORAL DAILY
Refills: 0 | Status: DISCONTINUED | OUTPATIENT
Start: 2025-07-31 | End: 2025-08-03

## 2025-07-31 RX ORDER — LOSARTAN POTASSIUM 100 MG/1
100 TABLET, FILM COATED ORAL DAILY
Refills: 0 | Status: DISCONTINUED | OUTPATIENT
Start: 2025-07-31 | End: 2025-07-31

## 2025-07-31 RX ADMIN — Medication 5 MILLIGRAM(S): at 22:15

## 2025-07-31 RX ADMIN — ROSUVASTATIN CALCIUM 10 MILLIGRAM(S): 20 TABLET, FILM COATED ORAL at 21:27

## 2025-07-31 RX ADMIN — Medication 400 MILLIGRAM(S): at 14:48

## 2025-07-31 RX ADMIN — DONEPEZIL HYDROCHLORIDE 10 MILLIGRAM(S): 5 TABLET ORAL at 21:27

## 2025-07-31 RX ADMIN — Medication 75 MILLILITER(S): at 18:23

## 2025-08-01 ENCOUNTER — RESULT REVIEW (OUTPATIENT)
Age: 80
End: 2025-08-01

## 2025-08-01 LAB
ANION GAP SERPL CALC-SCNC: 16 MMOL/L — SIGNIFICANT CHANGE UP (ref 5–17)
BUN SERPL-MCNC: 14.7 MG/DL — SIGNIFICANT CHANGE UP (ref 8–20)
CALCIUM SERPL-MCNC: 9.4 MG/DL — SIGNIFICANT CHANGE UP (ref 8.4–10.5)
CHLORIDE SERPL-SCNC: 101 MMOL/L — SIGNIFICANT CHANGE UP (ref 96–108)
CO2 SERPL-SCNC: 24 MMOL/L — SIGNIFICANT CHANGE UP (ref 22–29)
CREAT SERPL-MCNC: 0.78 MG/DL — SIGNIFICANT CHANGE UP (ref 0.5–1.3)
EGFR: 76 ML/MIN/1.73M2 — SIGNIFICANT CHANGE UP
EGFR: 76 ML/MIN/1.73M2 — SIGNIFICANT CHANGE UP
GLUCOSE SERPL-MCNC: 101 MG/DL — HIGH (ref 70–99)
HCT VFR BLD CALC: 46.8 % — HIGH (ref 34.5–45)
HGB BLD-MCNC: 15.7 G/DL — HIGH (ref 11.5–15.5)
MAGNESIUM SERPL-MCNC: 1.7 MG/DL — SIGNIFICANT CHANGE UP (ref 1.6–2.6)
MCHC RBC-ENTMCNC: 29.6 PG — SIGNIFICANT CHANGE UP (ref 27–34)
MCHC RBC-ENTMCNC: 33.5 G/DL — SIGNIFICANT CHANGE UP (ref 32–36)
MCV RBC AUTO: 88.1 FL — SIGNIFICANT CHANGE UP (ref 80–100)
NRBC # BLD AUTO: 0 K/UL — SIGNIFICANT CHANGE UP (ref 0–0)
NRBC # FLD: 0 K/UL — SIGNIFICANT CHANGE UP (ref 0–0)
NRBC BLD AUTO-RTO: 0 /100 WBCS — SIGNIFICANT CHANGE UP (ref 0–0)
PHOSPHATE SERPL-MCNC: 3 MG/DL — SIGNIFICANT CHANGE UP (ref 2.4–4.7)
PLATELET # BLD AUTO: 117 K/UL — LOW (ref 150–400)
PMV BLD: 9.7 FL — SIGNIFICANT CHANGE UP (ref 7–13)
POTASSIUM SERPL-MCNC: 3.6 MMOL/L — SIGNIFICANT CHANGE UP (ref 3.5–5.3)
POTASSIUM SERPL-SCNC: 3.6 MMOL/L — SIGNIFICANT CHANGE UP (ref 3.5–5.3)
RBC # BLD: 5.31 M/UL — HIGH (ref 3.8–5.2)
RBC # FLD: 14.3 % — SIGNIFICANT CHANGE UP (ref 10.3–14.5)
SODIUM SERPL-SCNC: 141 MMOL/L — SIGNIFICANT CHANGE UP (ref 135–145)
WBC # BLD: 8.23 K/UL — SIGNIFICANT CHANGE UP (ref 3.8–10.5)
WBC # FLD AUTO: 8.23 K/UL — SIGNIFICANT CHANGE UP (ref 3.8–10.5)

## 2025-08-01 PROCEDURE — 97163 PT EVAL HIGH COMPLEX 45 MIN: CPT

## 2025-08-01 PROCEDURE — 85730 THROMBOPLASTIN TIME PARTIAL: CPT

## 2025-08-01 PROCEDURE — 84100 ASSAY OF PHOSPHORUS: CPT

## 2025-08-01 PROCEDURE — 36415 COLL VENOUS BLD VENIPUNCTURE: CPT

## 2025-08-01 PROCEDURE — 99232 SBSQ HOSP IP/OBS MODERATE 35: CPT

## 2025-08-01 PROCEDURE — 73130 X-RAY EXAM OF HAND: CPT

## 2025-08-01 PROCEDURE — 97167 OT EVAL HIGH COMPLEX 60 MIN: CPT

## 2025-08-01 PROCEDURE — 72125 CT NECK SPINE W/O DYE: CPT

## 2025-08-01 PROCEDURE — 85027 COMPLETE CBC AUTOMATED: CPT

## 2025-08-01 PROCEDURE — 70450 CT HEAD/BRAIN W/O DYE: CPT

## 2025-08-01 PROCEDURE — 87641 MR-STAPH DNA AMP PROBE: CPT

## 2025-08-01 PROCEDURE — 87640 STAPH A DNA AMP PROBE: CPT

## 2025-08-01 PROCEDURE — 72170 X-RAY EXAM OF PELVIS: CPT

## 2025-08-01 PROCEDURE — 71260 CT THORAX DX C+: CPT

## 2025-08-01 PROCEDURE — 71045 X-RAY EXAM CHEST 1 VIEW: CPT

## 2025-08-01 PROCEDURE — 93306 TTE W/DOPPLER COMPLETE: CPT | Mod: 26

## 2025-08-01 PROCEDURE — 74177 CT ABD & PELVIS W/CONTRAST: CPT

## 2025-08-01 PROCEDURE — 99223 1ST HOSP IP/OBS HIGH 75: CPT

## 2025-08-01 PROCEDURE — 82550 ASSAY OF CK (CPK): CPT

## 2025-08-01 PROCEDURE — 85025 COMPLETE CBC W/AUTO DIFF WBC: CPT

## 2025-08-01 PROCEDURE — 70486 CT MAXILLOFACIAL W/O DYE: CPT

## 2025-08-01 PROCEDURE — 93005 ELECTROCARDIOGRAM TRACING: CPT

## 2025-08-01 PROCEDURE — 80053 COMPREHEN METABOLIC PANEL: CPT

## 2025-08-01 PROCEDURE — 81001 URINALYSIS AUTO W/SCOPE: CPT

## 2025-08-01 PROCEDURE — 70450 CT HEAD/BRAIN W/O DYE: CPT | Mod: 26

## 2025-08-01 PROCEDURE — 83735 ASSAY OF MAGNESIUM: CPT

## 2025-08-01 PROCEDURE — 82553 CREATINE MB FRACTION: CPT

## 2025-08-01 PROCEDURE — 80048 BASIC METABOLIC PNL TOTAL CA: CPT

## 2025-08-01 PROCEDURE — 85610 PROTHROMBIN TIME: CPT

## 2025-08-01 RX ORDER — DEXAMETHASONE 0.5 MG/1
20 TABLET ORAL DAILY
Refills: 0 | Status: DISCONTINUED | OUTPATIENT
Start: 2025-08-01 | End: 2025-08-01

## 2025-08-01 RX ORDER — MAGNESIUM SULFATE 500 MG/ML
2 SYRINGE (ML) INJECTION ONCE
Refills: 0 | Status: COMPLETED | OUTPATIENT
Start: 2025-08-01 | End: 2025-08-01

## 2025-08-01 RX ADMIN — Medication 25 GRAM(S): at 10:17

## 2025-08-01 RX ADMIN — LEVETIRACETAM 500 MILLIGRAM(S): 10 INJECTION, SOLUTION INTRAVENOUS at 18:16

## 2025-08-01 RX ADMIN — METOPROLOL SUCCINATE 100 MILLIGRAM(S): 50 TABLET, EXTENDED RELEASE ORAL at 06:16

## 2025-08-01 RX ADMIN — DONEPEZIL HYDROCHLORIDE 10 MILLIGRAM(S): 5 TABLET ORAL at 22:15

## 2025-08-01 RX ADMIN — Medication 1000 MILLIGRAM(S): at 11:26

## 2025-08-01 RX ADMIN — Medication 1 APPLICATION(S): at 11:14

## 2025-08-01 RX ADMIN — Medication 40 MILLIGRAM(S): at 06:16

## 2025-08-01 RX ADMIN — Medication 400 MILLIGRAM(S): at 06:14

## 2025-08-01 RX ADMIN — ROSUVASTATIN CALCIUM 10 MILLIGRAM(S): 20 TABLET, FILM COATED ORAL at 22:16

## 2025-08-01 RX ADMIN — DEXAMETHASONE 110 MILLIGRAM(S): 0.5 TABLET ORAL at 06:15

## 2025-08-01 RX ADMIN — LEVETIRACETAM 500 MILLIGRAM(S): 10 INJECTION, SOLUTION INTRAVENOUS at 06:16

## 2025-08-01 RX ADMIN — Medication 400 MILLIGRAM(S): at 17:20

## 2025-08-01 RX ADMIN — Medication 1000 MILLIGRAM(S): at 07:03

## 2025-08-01 RX ADMIN — Medication 1000 MILLIGRAM(S): at 17:56

## 2025-08-01 RX ADMIN — Medication 400 MILLIGRAM(S): at 11:13

## 2025-08-01 RX ADMIN — Medication 5 MILLIGRAM(S): at 22:15

## 2025-08-02 ENCOUNTER — TRANSCRIPTION ENCOUNTER (OUTPATIENT)
Age: 80
End: 2025-08-02

## 2025-08-02 LAB
ANION GAP SERPL CALC-SCNC: 12 MMOL/L — SIGNIFICANT CHANGE UP (ref 5–17)
BUN SERPL-MCNC: 20 MG/DL — SIGNIFICANT CHANGE UP (ref 8–20)
CALCIUM SERPL-MCNC: 9.1 MG/DL — SIGNIFICANT CHANGE UP (ref 8.4–10.5)
CHLORIDE SERPL-SCNC: 102 MMOL/L — SIGNIFICANT CHANGE UP (ref 96–108)
CO2 SERPL-SCNC: 26 MMOL/L — SIGNIFICANT CHANGE UP (ref 22–29)
CREAT SERPL-MCNC: 0.78 MG/DL — SIGNIFICANT CHANGE UP (ref 0.5–1.3)
EGFR: 76 ML/MIN/1.73M2 — SIGNIFICANT CHANGE UP
EGFR: 76 ML/MIN/1.73M2 — SIGNIFICANT CHANGE UP
GLUCOSE SERPL-MCNC: 101 MG/DL — HIGH (ref 70–99)
HCT VFR BLD CALC: 46.2 % — HIGH (ref 34.5–45)
HGB BLD-MCNC: 15.6 G/DL — HIGH (ref 11.5–15.5)
MAGNESIUM SERPL-MCNC: 2.3 MG/DL — SIGNIFICANT CHANGE UP (ref 1.6–2.6)
MCHC RBC-ENTMCNC: 29.3 PG — SIGNIFICANT CHANGE UP (ref 27–34)
MCHC RBC-ENTMCNC: 33.8 G/DL — SIGNIFICANT CHANGE UP (ref 32–36)
MCV RBC AUTO: 86.7 FL — SIGNIFICANT CHANGE UP (ref 80–100)
NRBC # BLD AUTO: 0 K/UL — SIGNIFICANT CHANGE UP (ref 0–0)
NRBC # FLD: 0 K/UL — SIGNIFICANT CHANGE UP (ref 0–0)
NRBC BLD AUTO-RTO: 0 /100 WBCS — SIGNIFICANT CHANGE UP (ref 0–0)
PHOSPHATE SERPL-MCNC: 3.8 MG/DL — SIGNIFICANT CHANGE UP (ref 2.4–4.7)
PLATELET # BLD AUTO: 141 K/UL — LOW (ref 150–400)
PMV BLD: 10.2 FL — SIGNIFICANT CHANGE UP (ref 7–13)
POTASSIUM SERPL-MCNC: 3.8 MMOL/L — SIGNIFICANT CHANGE UP (ref 3.5–5.3)
POTASSIUM SERPL-SCNC: 3.8 MMOL/L — SIGNIFICANT CHANGE UP (ref 3.5–5.3)
RBC # BLD: 5.33 M/UL — HIGH (ref 3.8–5.2)
RBC # FLD: 14.2 % — SIGNIFICANT CHANGE UP (ref 10.3–14.5)
SODIUM SERPL-SCNC: 140 MMOL/L — SIGNIFICANT CHANGE UP (ref 135–145)
WBC # BLD: 11.12 K/UL — HIGH (ref 3.8–10.5)
WBC # FLD AUTO: 11.12 K/UL — HIGH (ref 3.8–10.5)

## 2025-08-02 PROCEDURE — 99232 SBSQ HOSP IP/OBS MODERATE 35: CPT

## 2025-08-02 PROCEDURE — 70450 CT HEAD/BRAIN W/O DYE: CPT | Mod: 26

## 2025-08-02 RX ORDER — CALCIUM CARBONATE 750 MG/1
1 TABLET ORAL ONCE
Refills: 0 | Status: COMPLETED | OUTPATIENT
Start: 2025-08-02 | End: 2025-08-02

## 2025-08-02 RX ORDER — ENOXAPARIN SODIUM 100 MG/ML
40 INJECTION SUBCUTANEOUS
Refills: 0 | Status: DISCONTINUED | OUTPATIENT
Start: 2025-08-02 | End: 2025-08-03

## 2025-08-02 RX ORDER — TIOTROPIUM BROMIDE INHALATION SPRAY 3.12 UG/1
2 SPRAY, METERED RESPIRATORY (INHALATION) DAILY
Refills: 0 | Status: DISCONTINUED | OUTPATIENT
Start: 2025-08-02 | End: 2025-08-03

## 2025-08-02 RX ADMIN — Medication 40 MILLIGRAM(S): at 06:11

## 2025-08-02 RX ADMIN — METOPROLOL SUCCINATE 100 MILLIGRAM(S): 50 TABLET, EXTENDED RELEASE ORAL at 06:11

## 2025-08-02 RX ADMIN — Medication 5 MILLIGRAM(S): at 22:43

## 2025-08-02 RX ADMIN — Medication 1 DOSE(S): at 20:21

## 2025-08-02 RX ADMIN — ROSUVASTATIN CALCIUM 10 MILLIGRAM(S): 20 TABLET, FILM COATED ORAL at 22:43

## 2025-08-02 RX ADMIN — DONEPEZIL HYDROCHLORIDE 10 MILLIGRAM(S): 5 TABLET ORAL at 22:44

## 2025-08-02 RX ADMIN — LEVETIRACETAM 500 MILLIGRAM(S): 10 INJECTION, SOLUTION INTRAVENOUS at 06:11

## 2025-08-02 RX ADMIN — CALCIUM CARBONATE 1 TABLET(S): 750 TABLET ORAL at 17:29

## 2025-08-02 RX ADMIN — ENOXAPARIN SODIUM 40 MILLIGRAM(S): 100 INJECTION SUBCUTANEOUS at 22:44

## 2025-08-02 RX ADMIN — SERTRALINE 200 MILLIGRAM(S): 100 TABLET, FILM COATED ORAL at 13:38

## 2025-08-02 RX ADMIN — LEVETIRACETAM 500 MILLIGRAM(S): 10 INJECTION, SOLUTION INTRAVENOUS at 17:28

## 2025-08-02 RX ADMIN — Medication 1 APPLICATION(S): at 13:38

## 2025-08-03 VITALS — TEMPERATURE: 98 F

## 2025-08-03 LAB
ANION GAP SERPL CALC-SCNC: 10 MMOL/L — SIGNIFICANT CHANGE UP (ref 5–17)
BUN SERPL-MCNC: 21.6 MG/DL — HIGH (ref 8–20)
CALCIUM SERPL-MCNC: 9.1 MG/DL — SIGNIFICANT CHANGE UP (ref 8.4–10.5)
CHLORIDE SERPL-SCNC: 103 MMOL/L — SIGNIFICANT CHANGE UP (ref 96–108)
CO2 SERPL-SCNC: 27 MMOL/L — SIGNIFICANT CHANGE UP (ref 22–29)
CREAT SERPL-MCNC: 0.79 MG/DL — SIGNIFICANT CHANGE UP (ref 0.5–1.3)
EGFR: 75 ML/MIN/1.73M2 — SIGNIFICANT CHANGE UP
EGFR: 75 ML/MIN/1.73M2 — SIGNIFICANT CHANGE UP
GLUCOSE SERPL-MCNC: 104 MG/DL — HIGH (ref 70–99)
HCT VFR BLD CALC: 45.2 % — HIGH (ref 34.5–45)
HGB BLD-MCNC: 15.2 G/DL — SIGNIFICANT CHANGE UP (ref 11.5–15.5)
MAGNESIUM SERPL-MCNC: 2 MG/DL — SIGNIFICANT CHANGE UP (ref 1.6–2.6)
MCHC RBC-ENTMCNC: 29.3 PG — SIGNIFICANT CHANGE UP (ref 27–34)
MCHC RBC-ENTMCNC: 33.6 G/DL — SIGNIFICANT CHANGE UP (ref 32–36)
MCV RBC AUTO: 87.1 FL — SIGNIFICANT CHANGE UP (ref 80–100)
NRBC # BLD AUTO: 0 K/UL — SIGNIFICANT CHANGE UP (ref 0–0)
NRBC # FLD: 0 K/UL — SIGNIFICANT CHANGE UP (ref 0–0)
NRBC BLD AUTO-RTO: 0 /100 WBCS — SIGNIFICANT CHANGE UP (ref 0–0)
PHOSPHATE SERPL-MCNC: 3.3 MG/DL — SIGNIFICANT CHANGE UP (ref 2.4–4.7)
PLATELET # BLD AUTO: 142 K/UL — LOW (ref 150–400)
PMV BLD: 9.6 FL — SIGNIFICANT CHANGE UP (ref 7–13)
POTASSIUM SERPL-MCNC: 4.2 MMOL/L — SIGNIFICANT CHANGE UP (ref 3.5–5.3)
POTASSIUM SERPL-SCNC: 4.2 MMOL/L — SIGNIFICANT CHANGE UP (ref 3.5–5.3)
RBC # BLD: 5.19 M/UL — SIGNIFICANT CHANGE UP (ref 3.8–5.2)
RBC # FLD: 14.2 % — SIGNIFICANT CHANGE UP (ref 10.3–14.5)
SODIUM SERPL-SCNC: 140 MMOL/L — SIGNIFICANT CHANGE UP (ref 135–145)
WBC # BLD: 9.88 K/UL — SIGNIFICANT CHANGE UP (ref 3.8–10.5)
WBC # FLD AUTO: 9.88 K/UL — SIGNIFICANT CHANGE UP (ref 3.8–10.5)

## 2025-08-03 PROCEDURE — 94640 AIRWAY INHALATION TREATMENT: CPT

## 2025-08-03 PROCEDURE — 85027 COMPLETE CBC AUTOMATED: CPT

## 2025-08-03 PROCEDURE — 80053 COMPREHEN METABOLIC PANEL: CPT

## 2025-08-03 PROCEDURE — 81001 URINALYSIS AUTO W/SCOPE: CPT

## 2025-08-03 PROCEDURE — 84100 ASSAY OF PHOSPHORUS: CPT

## 2025-08-03 PROCEDURE — 85025 COMPLETE CBC W/AUTO DIFF WBC: CPT

## 2025-08-03 PROCEDURE — 85610 PROTHROMBIN TIME: CPT

## 2025-08-03 PROCEDURE — 70486 CT MAXILLOFACIAL W/O DYE: CPT

## 2025-08-03 PROCEDURE — 87640 STAPH A DNA AMP PROBE: CPT

## 2025-08-03 PROCEDURE — 72125 CT NECK SPINE W/O DYE: CPT

## 2025-08-03 PROCEDURE — 72170 X-RAY EXAM OF PELVIS: CPT

## 2025-08-03 PROCEDURE — 36415 COLL VENOUS BLD VENIPUNCTURE: CPT

## 2025-08-03 PROCEDURE — 82553 CREATINE MB FRACTION: CPT

## 2025-08-03 PROCEDURE — 93005 ELECTROCARDIOGRAM TRACING: CPT

## 2025-08-03 PROCEDURE — 73130 X-RAY EXAM OF HAND: CPT

## 2025-08-03 PROCEDURE — 85730 THROMBOPLASTIN TIME PARTIAL: CPT

## 2025-08-03 PROCEDURE — 87641 MR-STAPH DNA AMP PROBE: CPT

## 2025-08-03 PROCEDURE — 12011 RPR F/E/E/N/L/M 2.5 CM/<: CPT

## 2025-08-03 PROCEDURE — 74177 CT ABD & PELVIS W/CONTRAST: CPT

## 2025-08-03 PROCEDURE — 70450 CT HEAD/BRAIN W/O DYE: CPT

## 2025-08-03 PROCEDURE — 97535 SELF CARE MNGMENT TRAINING: CPT

## 2025-08-03 PROCEDURE — 97163 PT EVAL HIGH COMPLEX 45 MIN: CPT

## 2025-08-03 PROCEDURE — 83735 ASSAY OF MAGNESIUM: CPT

## 2025-08-03 PROCEDURE — 99232 SBSQ HOSP IP/OBS MODERATE 35: CPT

## 2025-08-03 PROCEDURE — C8929: CPT

## 2025-08-03 PROCEDURE — 97167 OT EVAL HIGH COMPLEX 60 MIN: CPT

## 2025-08-03 PROCEDURE — 97530 THERAPEUTIC ACTIVITIES: CPT

## 2025-08-03 PROCEDURE — 71045 X-RAY EXAM CHEST 1 VIEW: CPT

## 2025-08-03 PROCEDURE — 99291 CRITICAL CARE FIRST HOUR: CPT

## 2025-08-03 PROCEDURE — 82550 ASSAY OF CK (CPK): CPT

## 2025-08-03 PROCEDURE — 80048 BASIC METABOLIC PNL TOTAL CA: CPT

## 2025-08-03 PROCEDURE — 71260 CT THORAX DX C+: CPT

## 2025-08-03 PROCEDURE — 96374 THER/PROPH/DIAG INJ IV PUSH: CPT | Mod: XU

## 2025-08-03 RX ORDER — LEVETIRACETAM 10 MG/ML
1 INJECTION, SOLUTION INTRAVENOUS
Qty: 10 | Refills: 0
Start: 2025-08-03 | End: 2025-08-07

## 2025-08-03 RX ORDER — ACETAMINOPHEN 500 MG/5ML
650 LIQUID (ML) ORAL EVERY 6 HOURS
Refills: 0 | Status: DISCONTINUED | OUTPATIENT
Start: 2025-08-03 | End: 2025-08-03

## 2025-08-03 RX ORDER — ACETAMINOPHEN 500 MG/5ML
2 LIQUID (ML) ORAL
Qty: 0 | Refills: 0 | DISCHARGE
Start: 2025-08-03

## 2025-08-03 RX ORDER — LOSARTAN POTASSIUM 100 MG/1
100 TABLET, FILM COATED ORAL DAILY
Refills: 0 | Status: DISCONTINUED | OUTPATIENT
Start: 2025-08-03 | End: 2025-08-03

## 2025-08-03 RX ADMIN — Medication 50 MILLILITER(S): at 08:27

## 2025-08-03 RX ADMIN — Medication 1 APPLICATION(S): at 12:36

## 2025-08-03 RX ADMIN — Medication 40 MILLIGRAM(S): at 06:48

## 2025-08-03 RX ADMIN — METOPROLOL SUCCINATE 100 MILLIGRAM(S): 50 TABLET, EXTENDED RELEASE ORAL at 06:26

## 2025-08-03 RX ADMIN — Medication 1 DOSE(S): at 08:27

## 2025-08-03 RX ADMIN — TIOTROPIUM BROMIDE INHALATION SPRAY 2 PUFF(S): 3.12 SPRAY, METERED RESPIRATORY (INHALATION) at 08:38

## 2025-08-03 RX ADMIN — Medication 650 MILLIGRAM(S): at 09:13

## 2025-08-03 RX ADMIN — Medication 650 MILLIGRAM(S): at 10:13

## 2025-08-03 RX ADMIN — Medication 10 MILLIGRAM(S): at 08:36

## 2025-08-03 RX ADMIN — SERTRALINE 200 MILLIGRAM(S): 100 TABLET, FILM COATED ORAL at 12:36

## 2025-08-03 RX ADMIN — LEVETIRACETAM 500 MILLIGRAM(S): 10 INJECTION, SOLUTION INTRAVENOUS at 06:26

## 2025-08-05 ENCOUNTER — NON-APPOINTMENT (OUTPATIENT)
Age: 80
End: 2025-08-05

## 2025-08-08 ENCOUNTER — NON-APPOINTMENT (OUTPATIENT)
Age: 80
End: 2025-08-08

## 2025-08-20 ENCOUNTER — APPOINTMENT (OUTPATIENT)
Dept: PULMONOLOGY | Facility: CLINIC | Age: 80
End: 2025-08-20
Payer: MEDICARE

## 2025-08-20 ENCOUNTER — RESULT CHARGE (OUTPATIENT)
Age: 80
End: 2025-08-20

## 2025-08-20 VITALS
OXYGEN SATURATION: 95 % | DIASTOLIC BLOOD PRESSURE: 76 MMHG | SYSTOLIC BLOOD PRESSURE: 112 MMHG | HEART RATE: 50 BPM | RESPIRATION RATE: 16 BRPM

## 2025-08-20 VITALS — WEIGHT: 124 LBS | BODY MASS INDEX: 26.75 KG/M2 | HEIGHT: 57 IN

## 2025-08-20 DIAGNOSIS — R06.09 OTHER FORMS OF DYSPNEA: ICD-10-CM

## 2025-08-20 DIAGNOSIS — J44.9 CHRONIC OBSTRUCTIVE PULMONARY DISEASE, UNSPECIFIED: ICD-10-CM

## 2025-08-20 DIAGNOSIS — J44.89 OTHER SPECIFIED CHRONIC OBSTRUCTIVE PULMONARY DISEASE: ICD-10-CM

## 2025-08-20 PROCEDURE — 99214 OFFICE O/P EST MOD 30 MIN: CPT | Mod: 25

## 2025-08-20 PROCEDURE — 94010 BREATHING CAPACITY TEST: CPT

## 2025-08-22 ENCOUNTER — APPOINTMENT (OUTPATIENT)
Dept: NEUROSURGERY | Facility: CLINIC | Age: 80
End: 2025-08-22
Payer: MEDICARE

## 2025-08-22 VITALS
HEART RATE: 70 BPM | SYSTOLIC BLOOD PRESSURE: 112 MMHG | DIASTOLIC BLOOD PRESSURE: 76 MMHG | BODY MASS INDEX: 26.75 KG/M2 | HEIGHT: 57 IN | OXYGEN SATURATION: 93 % | WEIGHT: 124 LBS | TEMPERATURE: 98.1 F

## 2025-08-22 DIAGNOSIS — R41.3 OTHER AMNESIA: ICD-10-CM

## 2025-08-22 DIAGNOSIS — S06.5XAA TRAUMATIC SUBDURAL HEMORRHAGE WITH LOSS OF CONSCIOUSNESS STATUS UNKNOWN, INITIAL ENCOUNTER: ICD-10-CM

## 2025-08-22 PROCEDURE — 99215 OFFICE O/P EST HI 40 MIN: CPT

## 2025-08-22 RX ORDER — RIVAROXABAN 20 MG/1
20 TABLET, FILM COATED ORAL
Refills: 0 | Status: ACTIVE | COMMUNITY

## (undated) DEVICE — FORCEP RADIAL JAW 4 W NDL 2.4MM 2.8MM 240CM ORANGE DISP

## (undated) DEVICE — SOL IRR BAG H2O 1000ML

## (undated) DEVICE — SOL IRR BAG NS 0.9% 1000ML

## (undated) DEVICE — DRSG 2X2

## (undated) DEVICE — KNFE VITREC 20G

## (undated) DEVICE — LENS VITRECTOMY FLAT

## (undated) DEVICE — SYR IV FLUSH SALINE 10ML 30/TY

## (undated) DEVICE — SUT VICRYL 7-0 12" TG140-8 DA

## (undated) DEVICE — TUBING IV EXTENSION MACRO W CLAVE 7"

## (undated) DEVICE — ILM FORCEP 23G

## (undated) DEVICE — CONSTELLATION TOTAL PLUS PAK 23G

## (undated) DEVICE — CANNULA ALCON SOFT TIP 25G

## (undated) DEVICE — DIATHERMY PROBE 25GA

## (undated) DEVICE — UNDERPAD LINEN SAVER 23 X 36"

## (undated) DEVICE — STERIS DEFENDO 3-PIECE KIT (AIR/WATER, SUCTION & BIOPSY VALVES)

## (undated) DEVICE — DRSG CURITY GAUZE SPONGE 4 X 4" 12-PLY NON-STERILE

## (undated) DEVICE — GLV 7.5 PROTEXIS (WHITE)

## (undated) DEVICE — VENODYNE/SCD SLEEVE CALF MEDIUM

## (undated) DEVICE — KNIFE ALCON MVR V-LANCE 23G (BLACK)

## (undated) DEVICE — WARMING BLANKET FULL ADULT

## (undated) DEVICE — CATH IV SAFE BC 22G X 1" (BLUE)

## (undated) DEVICE — BITE BLOCK ADULT 20 X 27MM (GREEN)

## (undated) DEVICE — ILM FORCEP 25G

## (undated) DEVICE — SYR SLIP 10CC

## (undated) DEVICE — MASK PROC EAR LOOP

## (undated) DEVICE — PACK CONSTELLATION POST 25G 20K

## (undated) DEVICE — TUBING ALARIS PUMP MODULE NON-DEHP

## (undated) DEVICE — SOL BAG NS 0.9% 1000ML

## (undated) DEVICE — SYR LUER SLIP TIP 50CC

## (undated) DEVICE — GOWN IMPERV XL

## (undated) DEVICE — CANNULA MEDONE FLEXTIP 25G

## (undated) DEVICE — LIGHT SHIELD CORNEAL

## (undated) DEVICE — PACK IV START WITH CHG

## (undated) DEVICE — SOL IRR BAL SALT + 500ML

## (undated) DEVICE — SENSOR O2 FINGER ADULT

## (undated) DEVICE — DENTURE CUP PINK

## (undated) DEVICE — ELCTR BIPOLAR CORD J&J 12FT DISP

## (undated) DEVICE — WARMING BLANKET LOWER ADULT

## (undated) DEVICE — PACK VITRECTOMY

## (undated) DEVICE — CANNULA ALCON SOFT TIP 23G

## (undated) DEVICE — FORCEP BIOPSY ENDOSCOPIC 2.8MM DISP

## (undated) DEVICE — DRAPE OPHTHALMIC W POUCH